# Patient Record
Sex: FEMALE | Race: OTHER | NOT HISPANIC OR LATINO
[De-identification: names, ages, dates, MRNs, and addresses within clinical notes are randomized per-mention and may not be internally consistent; named-entity substitution may affect disease eponyms.]

---

## 2017-07-13 ENCOUNTER — APPOINTMENT (OUTPATIENT)
Dept: VASCULAR SURGERY | Facility: CLINIC | Age: 77
End: 2017-07-13

## 2017-07-20 ENCOUNTER — APPOINTMENT (OUTPATIENT)
Dept: VASCULAR SURGERY | Facility: CLINIC | Age: 77
End: 2017-07-20

## 2017-07-27 ENCOUNTER — APPOINTMENT (OUTPATIENT)
Dept: VASCULAR SURGERY | Facility: CLINIC | Age: 77
End: 2017-07-27
Payer: MEDICARE

## 2017-07-27 PROCEDURE — 10140 I&D HMTMA SEROMA/FLUID COLLJ: CPT

## 2017-07-27 PROCEDURE — 99213 OFFICE O/P EST LOW 20 MIN: CPT | Mod: 25

## 2017-08-03 ENCOUNTER — APPOINTMENT (OUTPATIENT)
Dept: VASCULAR SURGERY | Facility: CLINIC | Age: 77
End: 2017-08-03
Payer: MEDICARE

## 2017-08-03 PROCEDURE — 99024 POSTOP FOLLOW-UP VISIT: CPT

## 2017-08-10 ENCOUNTER — APPOINTMENT (OUTPATIENT)
Dept: VASCULAR SURGERY | Facility: CLINIC | Age: 77
End: 2017-08-10
Payer: MEDICARE

## 2017-08-10 PROCEDURE — 99211 OFF/OP EST MAY X REQ PHY/QHP: CPT

## 2017-08-17 ENCOUNTER — APPOINTMENT (OUTPATIENT)
Dept: VASCULAR SURGERY | Facility: CLINIC | Age: 77
End: 2017-08-17

## 2017-08-24 ENCOUNTER — APPOINTMENT (OUTPATIENT)
Dept: VASCULAR SURGERY | Facility: CLINIC | Age: 77
End: 2017-08-24
Payer: MEDICARE

## 2017-08-24 PROCEDURE — 99211 OFF/OP EST MAY X REQ PHY/QHP: CPT

## 2017-09-07 ENCOUNTER — APPOINTMENT (OUTPATIENT)
Dept: VASCULAR SURGERY | Facility: CLINIC | Age: 77
End: 2017-09-07
Payer: MEDICARE

## 2017-09-07 PROCEDURE — 99213 OFFICE O/P EST LOW 20 MIN: CPT

## 2017-09-14 ENCOUNTER — APPOINTMENT (OUTPATIENT)
Dept: VASCULAR SURGERY | Facility: CLINIC | Age: 77
End: 2017-09-14
Payer: MEDICARE

## 2017-09-14 PROCEDURE — 99211 OFF/OP EST MAY X REQ PHY/QHP: CPT

## 2017-09-21 ENCOUNTER — APPOINTMENT (OUTPATIENT)
Dept: VASCULAR SURGERY | Facility: CLINIC | Age: 77
End: 2017-09-21
Payer: MEDICARE

## 2017-09-21 PROCEDURE — 99211 OFF/OP EST MAY X REQ PHY/QHP: CPT

## 2017-09-28 ENCOUNTER — APPOINTMENT (OUTPATIENT)
Dept: VASCULAR SURGERY | Facility: CLINIC | Age: 77
End: 2017-09-28

## 2017-10-09 ENCOUNTER — APPOINTMENT (OUTPATIENT)
Dept: OPHTHALMOLOGY | Facility: CLINIC | Age: 77
End: 2017-10-09

## 2017-12-28 ENCOUNTER — OUTPATIENT (OUTPATIENT)
Dept: OUTPATIENT SERVICES | Facility: HOSPITAL | Age: 77
LOS: 1 days | End: 2017-12-28
Payer: MEDICARE

## 2017-12-28 PROCEDURE — 71020: CPT | Mod: 26

## 2017-12-28 PROCEDURE — 71046 X-RAY EXAM CHEST 2 VIEWS: CPT

## 2017-12-29 ENCOUNTER — OUTPATIENT (OUTPATIENT)
Dept: OUTPATIENT SERVICES | Facility: HOSPITAL | Age: 77
LOS: 1 days | End: 2017-12-29
Payer: MEDICARE

## 2017-12-29 PROCEDURE — 71250 CT THORAX DX C-: CPT | Mod: 26

## 2017-12-29 PROCEDURE — 71250 CT THORAX DX C-: CPT

## 2018-01-03 ENCOUNTER — EMERGENCY (EMERGENCY)
Facility: HOSPITAL | Age: 78
LOS: 1 days | Discharge: HOME | End: 2018-01-03
Attending: EMERGENCY MEDICINE | Admitting: EMERGENCY MEDICINE
Payer: MEDICARE

## 2018-01-03 VITALS
DIASTOLIC BLOOD PRESSURE: 77 MMHG | WEIGHT: 132.94 LBS | HEIGHT: 67 IN | SYSTOLIC BLOOD PRESSURE: 139 MMHG | RESPIRATION RATE: 16 BRPM | OXYGEN SATURATION: 98 % | TEMPERATURE: 98 F | HEART RATE: 65 BPM
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
## 2018-01-03 DIAGNOSIS — S02.2XXA FRACTURE OF NASAL BONES, INITIAL ENCOUNTER FOR CLOSED FRACTURE: ICD-10-CM

## 2018-01-03 DIAGNOSIS — S09.93XA UNSPECIFIED INJURY OF FACE, INITIAL ENCOUNTER: ICD-10-CM

## 2018-01-03 DIAGNOSIS — Z79.899 OTHER LONG TERM (CURRENT) DRUG THERAPY: ICD-10-CM
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
## 2018-01-03 DIAGNOSIS — Z23 ENCOUNTER FOR IMMUNIZATION: ICD-10-CM

## 2018-01-03 DIAGNOSIS — Z95.2 PRESENCE OF PROSTHETIC HEART VALVE: Chronic | ICD-10-CM

## 2018-01-03 DIAGNOSIS — S09.90XA UNSPECIFIED INJURY OF HEAD, INITIAL ENCOUNTER: ICD-10-CM
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
## 2018-01-03 PROCEDURE — 99284 EMERGENCY DEPT VISIT MOD MDM: CPT

## 2018-01-03 PROCEDURE — 21310: CPT

## 2018-01-03 PROCEDURE — 70486 CT MAXILLOFACIAL W/O DYE: CPT | Mod: 26

## 2018-01-03 PROCEDURE — 90715 TDAP VACCINE 7 YRS/> IM: CPT

## 2018-01-03 PROCEDURE — 72125 CT NECK SPINE W/O DYE: CPT | Mod: 26

## 2018-01-03 PROCEDURE — 99284 EMERGENCY DEPT VISIT MOD MDM: CPT | Mod: 25

## 2018-01-03 PROCEDURE — 72125 CT NECK SPINE W/O DYE: CPT

## 2018-01-03 PROCEDURE — 70486 CT MAXILLOFACIAL W/O DYE: CPT

## 2018-01-03 PROCEDURE — 90471 IMMUNIZATION ADMIN: CPT

## 2018-01-03 PROCEDURE — 70450 CT HEAD/BRAIN W/O DYE: CPT

## 2018-01-03 PROCEDURE — 70450 CT HEAD/BRAIN W/O DYE: CPT | Mod: 26

## 2018-01-03 RX ORDER — TETANUS TOXOID, REDUCED DIPHTHERIA TOXOID AND ACELLULAR PERTUSSIS VACCINE, ADSORBED 5; 2.5; 8; 8; 2.5 [IU]/.5ML; [IU]/.5ML; UG/.5ML; UG/.5ML; UG/.5ML
0.5 SUSPENSION INTRAMUSCULAR ONCE
Qty: 0 | Refills: 0 | Status: COMPLETED | OUTPATIENT
Start: 2018-01-03 | End: 2018-01-03

## 2018-01-03 RX ADMIN — TETANUS TOXOID, REDUCED DIPHTHERIA TOXOID AND ACELLULAR PERTUSSIS VACCINE, ADSORBED 0.5 MILLILITER(S): 5; 2.5; 8; 8; 2.5 SUSPENSION INTRAMUSCULAR at 14:43

## 2018-01-03 NOTE — ED PROVIDER NOTE - CARE PLAN
Principal Discharge DX:	Traumatic injury of head, initial encounter  Secondary Diagnosis:	Closed fracture of nasal bone, initial encounter

## 2018-01-03 NOTE — ED PROVIDER NOTE - MEDICAL DECISION MAKING DETAILS
here w/ head trauma w/ nasal deformity. Unsure if LOC. on coumadin. plan for ct head/max face/c spine. if neg, to dc home. pt wants to minimize time in ED, labs offered, states coumadin level stable, does not want to check or be observed in ED. DC home in NAD with strict return precautions given.

## 2018-01-03 NOTE — ED ADULT TRIAGE NOTE - CHIEF COMPLAINT QUOTE
pt had mechanical fall & hit face on the cement. bruising & swelling to the nose with abrasion. denies LOC, HA, dizziness. pt on coumadin.

## 2018-01-03 NOTE — ED ADULT NURSE NOTE - OBJECTIVE STATEMENT
patient s/p trip and fall outside and hit her nose and face. no LOC. patient states that she takes coumadin because she has a hx of mitral valve replacement. initially nose was bleeding but now stopped. swelling to bridge of nose. denies dizziness, headache, vision change. denies neck or back pain patient last had INR checked a few weeks ago and was therapeutic. unknown last tetanus. ice pack applied, pending MD chino. no acute signs of distress. will continue to monitor.

## 2018-01-03 NOTE — ED PROVIDER NOTE - OBJECTIVE STATEMENT
76yo F hx of rheumatic fever c/p mitral valve replacement w/ mechanical valve on coumadin, hypothyroid, here w/ complaint of trip and fall on sidewalk. Pt hit her face directly on nose. Denies HA or neck pain, but unsure if had LOC, does not quite remember. No N/V. No motor weakness or sensory loss. Ambulatory without issue. Hx of rhinoplasty, very concerned about fx.

## 2018-01-08 ENCOUNTER — APPOINTMENT (OUTPATIENT)
Dept: PLASTIC SURGERY | Facility: CLINIC | Age: 78
End: 2018-01-08
Payer: MEDICARE

## 2018-01-08 VITALS — HEIGHT: 67 IN | WEIGHT: 133 LBS | BODY MASS INDEX: 20.88 KG/M2

## 2018-01-08 PROCEDURE — 99202 OFFICE O/P NEW SF 15 MIN: CPT

## 2018-01-23 ENCOUNTER — TRANSCRIPTION ENCOUNTER (OUTPATIENT)
Age: 78
End: 2018-01-23

## 2019-05-13 ENCOUNTER — APPOINTMENT (OUTPATIENT)
Dept: OPHTHALMOLOGY | Facility: CLINIC | Age: 79
End: 2019-05-13
Payer: MEDICARE

## 2019-05-13 DIAGNOSIS — H00.14 CHALAZION LEFT UPPER EYELID: ICD-10-CM

## 2019-05-13 PROCEDURE — 92012 INTRM OPH EXAM EST PATIENT: CPT

## 2021-04-30 ENCOUNTER — APPOINTMENT (OUTPATIENT)
Dept: OPHTHALMOLOGY | Facility: CLINIC | Age: 81
End: 2021-04-30
Payer: MEDICARE

## 2021-04-30 ENCOUNTER — NON-APPOINTMENT (OUTPATIENT)
Age: 81
End: 2021-04-30

## 2021-04-30 PROCEDURE — 99072 ADDL SUPL MATRL&STAF TM PHE: CPT

## 2021-04-30 PROCEDURE — 92014 COMPRE OPH EXAM EST PT 1/>: CPT

## 2021-06-07 ENCOUNTER — APPOINTMENT (OUTPATIENT)
Dept: OPHTHALMOLOGY | Facility: CLINIC | Age: 81
End: 2021-06-07

## 2021-06-27 ENCOUNTER — TRANSCRIPTION ENCOUNTER (OUTPATIENT)
Age: 81
End: 2021-06-27

## 2021-07-01 ENCOUNTER — TRANSCRIPTION ENCOUNTER (OUTPATIENT)
Age: 81
End: 2021-07-01

## 2021-07-02 ENCOUNTER — TRANSCRIPTION ENCOUNTER (OUTPATIENT)
Age: 81
End: 2021-07-02

## 2021-12-13 ENCOUNTER — TRANSCRIPTION ENCOUNTER (OUTPATIENT)
Age: 81
End: 2021-12-13

## 2021-12-23 ENCOUNTER — TRANSCRIPTION ENCOUNTER (OUTPATIENT)
Age: 81
End: 2021-12-23

## 2021-12-23 ENCOUNTER — APPOINTMENT (OUTPATIENT)
Dept: VASCULAR SURGERY | Facility: CLINIC | Age: 81
End: 2021-12-23
Payer: MEDICARE

## 2021-12-23 ENCOUNTER — INPATIENT (INPATIENT)
Facility: HOSPITAL | Age: 81
LOS: 3 days | Discharge: HOME | DRG: 571 | End: 2021-12-27
Attending: STUDENT IN AN ORGANIZED HEALTH CARE EDUCATION/TRAINING PROGRAM | Admitting: STUDENT IN AN ORGANIZED HEALTH CARE EDUCATION/TRAINING PROGRAM
Payer: MEDICARE

## 2021-12-23 VITALS
HEART RATE: 75 BPM | HEIGHT: 67 IN | SYSTOLIC BLOOD PRESSURE: 125 MMHG | DIASTOLIC BLOOD PRESSURE: 72 MMHG | TEMPERATURE: 98 F | RESPIRATION RATE: 18 BRPM | WEIGHT: 125 LBS

## 2021-12-23 DIAGNOSIS — Z95.2 PRESENCE OF PROSTHETIC HEART VALVE: Chronic | ICD-10-CM

## 2021-12-23 LAB
ALBUMIN SERPL ELPH-MCNC: 4.2 G/DL — SIGNIFICANT CHANGE UP (ref 3.3–5)
ALP SERPL-CCNC: 45 U/L — SIGNIFICANT CHANGE UP (ref 40–120)
ALT FLD-CCNC: 20 U/L — SIGNIFICANT CHANGE UP (ref 10–45)
ANION GAP SERPL CALC-SCNC: 10 MMOL/L — SIGNIFICANT CHANGE UP (ref 5–17)
APTT BLD: 36.8 SEC — HIGH (ref 27.5–35.5)
AST SERPL-CCNC: 43 U/L — HIGH (ref 10–40)
BASOPHILS # BLD AUTO: 0.05 K/UL — SIGNIFICANT CHANGE UP (ref 0–0.2)
BASOPHILS NFR BLD AUTO: 0.6 % — SIGNIFICANT CHANGE UP (ref 0–2)
BILIRUB SERPL-MCNC: 0.4 MG/DL — SIGNIFICANT CHANGE UP (ref 0.2–1.2)
BLD GP AB SCN SERPL QL: NEGATIVE — SIGNIFICANT CHANGE UP
BUN SERPL-MCNC: 15 MG/DL — SIGNIFICANT CHANGE UP (ref 7–23)
CALCIUM SERPL-MCNC: 9.1 MG/DL — SIGNIFICANT CHANGE UP (ref 8.4–10.5)
CHLORIDE SERPL-SCNC: 100 MMOL/L — SIGNIFICANT CHANGE UP (ref 96–108)
CO2 SERPL-SCNC: 27 MMOL/L — SIGNIFICANT CHANGE UP (ref 22–31)
CREAT SERPL-MCNC: 0.82 MG/DL — SIGNIFICANT CHANGE UP (ref 0.5–1.3)
EOSINOPHIL # BLD AUTO: 0.1 K/UL — SIGNIFICANT CHANGE UP (ref 0–0.5)
EOSINOPHIL NFR BLD AUTO: 1.2 % — SIGNIFICANT CHANGE UP (ref 0–6)
GLUCOSE SERPL-MCNC: 99 MG/DL — SIGNIFICANT CHANGE UP (ref 70–99)
HCT VFR BLD CALC: 30.6 % — LOW (ref 34.5–45)
HGB BLD-MCNC: 9.8 G/DL — LOW (ref 11.5–15.5)
IMM GRANULOCYTES NFR BLD AUTO: 0.5 % — SIGNIFICANT CHANGE UP (ref 0–1.5)
INR BLD: 1.76 — HIGH (ref 0.88–1.16)
LYMPHOCYTES # BLD AUTO: 0.89 K/UL — LOW (ref 1–3.3)
LYMPHOCYTES # BLD AUTO: 11 % — LOW (ref 13–44)
MCHC RBC-ENTMCNC: 31.8 PG — SIGNIFICANT CHANGE UP (ref 27–34)
MCHC RBC-ENTMCNC: 32 GM/DL — SIGNIFICANT CHANGE UP (ref 32–36)
MCV RBC AUTO: 99.4 FL — SIGNIFICANT CHANGE UP (ref 80–100)
MONOCYTES # BLD AUTO: 0.73 K/UL — SIGNIFICANT CHANGE UP (ref 0–0.9)
MONOCYTES NFR BLD AUTO: 9 % — SIGNIFICANT CHANGE UP (ref 2–14)
NEUTROPHILS # BLD AUTO: 6.28 K/UL — SIGNIFICANT CHANGE UP (ref 1.8–7.4)
NEUTROPHILS NFR BLD AUTO: 77.7 % — HIGH (ref 43–77)
NRBC # BLD: 0 /100 WBCS — SIGNIFICANT CHANGE UP (ref 0–0)
PLATELET # BLD AUTO: 193 K/UL — SIGNIFICANT CHANGE UP (ref 150–400)
POTASSIUM SERPL-MCNC: 4.6 MMOL/L — SIGNIFICANT CHANGE UP (ref 3.5–5.3)
POTASSIUM SERPL-SCNC: 4.6 MMOL/L — SIGNIFICANT CHANGE UP (ref 3.5–5.3)
PROT SERPL-MCNC: 6.6 G/DL — SIGNIFICANT CHANGE UP (ref 6–8.3)
PROTHROM AB SERPL-ACNC: 20.6 SEC — HIGH (ref 10.6–13.6)
RBC # BLD: 3.08 M/UL — LOW (ref 3.8–5.2)
RBC # FLD: 17.2 % — HIGH (ref 10.3–14.5)
RH IG SCN BLD-IMP: POSITIVE — SIGNIFICANT CHANGE UP
RH IG SCN BLD-IMP: POSITIVE — SIGNIFICANT CHANGE UP
SARS-COV-2 RNA SPEC QL NAA+PROBE: NEGATIVE — SIGNIFICANT CHANGE UP
SODIUM SERPL-SCNC: 137 MMOL/L — SIGNIFICANT CHANGE UP (ref 135–145)
WBC # BLD: 8.09 K/UL — SIGNIFICANT CHANGE UP (ref 3.8–10.5)
WBC # FLD AUTO: 8.09 K/UL — SIGNIFICANT CHANGE UP (ref 3.8–10.5)

## 2021-12-23 PROCEDURE — 99285 EMERGENCY DEPT VISIT HI MDM: CPT | Mod: 25

## 2021-12-23 PROCEDURE — 71045 X-RAY EXAM CHEST 1 VIEW: CPT | Mod: 26

## 2021-12-23 PROCEDURE — 93010 ELECTROCARDIOGRAM REPORT: CPT

## 2021-12-23 PROCEDURE — 99215 OFFICE O/P EST HI 40 MIN: CPT

## 2021-12-23 PROCEDURE — 99222 1ST HOSP IP/OBS MODERATE 55: CPT | Mod: GC

## 2021-12-23 RX ORDER — PIPERACILLIN AND TAZOBACTAM 4; .5 G/20ML; G/20ML
3.38 INJECTION, POWDER, LYOPHILIZED, FOR SOLUTION INTRAVENOUS ONCE
Refills: 0 | Status: COMPLETED | OUTPATIENT
Start: 2021-12-23 | End: 2021-12-23

## 2021-12-23 RX ORDER — LEVOTHYROXINE SODIUM 125 MCG
1 TABLET ORAL
Qty: 0 | Refills: 0 | DISCHARGE

## 2021-12-23 RX ORDER — CARVEDILOL PHOSPHATE 80 MG/1
3.12 CAPSULE, EXTENDED RELEASE ORAL EVERY 12 HOURS
Refills: 0 | Status: DISCONTINUED | OUTPATIENT
Start: 2021-12-23 | End: 2021-12-27

## 2021-12-23 RX ORDER — DIGOXIN 250 MCG
1 TABLET ORAL
Qty: 0 | Refills: 0 | DISCHARGE

## 2021-12-23 RX ORDER — HEPARIN SODIUM 5000 [USP'U]/ML
1100 INJECTION INTRAVENOUS; SUBCUTANEOUS
Qty: 25000 | Refills: 0 | Status: DISCONTINUED | OUTPATIENT
Start: 2021-12-23 | End: 2021-12-25

## 2021-12-23 RX ORDER — PIPERACILLIN AND TAZOBACTAM 4; .5 G/20ML; G/20ML
3.38 INJECTION, POWDER, LYOPHILIZED, FOR SOLUTION INTRAVENOUS EVERY 6 HOURS
Refills: 0 | Status: DISCONTINUED | OUTPATIENT
Start: 2021-12-24 | End: 2021-12-25

## 2021-12-23 RX ORDER — FUROSEMIDE 40 MG
1 TABLET ORAL
Qty: 0 | Refills: 0 | DISCHARGE

## 2021-12-23 RX ORDER — VANCOMYCIN HCL 1 G
VIAL (EA) INTRAVENOUS
Refills: 0 | Status: DISCONTINUED | OUTPATIENT
Start: 2021-12-23 | End: 2021-12-23

## 2021-12-23 RX ORDER — PIPERACILLIN AND TAZOBACTAM 4; .5 G/20ML; G/20ML
3.38 INJECTION, POWDER, LYOPHILIZED, FOR SOLUTION INTRAVENOUS EVERY 6 HOURS
Refills: 0 | Status: DISCONTINUED | OUTPATIENT
Start: 2021-12-23 | End: 2021-12-23

## 2021-12-23 RX ORDER — ROSUVASTATIN CALCIUM 5 MG/1
1 TABLET ORAL
Qty: 0 | Refills: 0 | DISCHARGE

## 2021-12-23 RX ORDER — LEVOTHYROXINE SODIUM 125 MCG
75 TABLET ORAL
Refills: 0 | Status: DISCONTINUED | OUTPATIENT
Start: 2021-12-24 | End: 2021-12-27

## 2021-12-23 RX ORDER — WARFARIN SODIUM 2.5 MG/1
5 TABLET ORAL
Qty: 0 | Refills: 0 | DISCHARGE

## 2021-12-23 RX ORDER — ATORVASTATIN CALCIUM 80 MG/1
20 TABLET, FILM COATED ORAL AT BEDTIME
Refills: 0 | Status: DISCONTINUED | OUTPATIENT
Start: 2021-12-23 | End: 2021-12-27

## 2021-12-23 RX ORDER — SACUBITRIL AND VALSARTAN 24; 26 MG/1; MG/1
1 TABLET, FILM COATED ORAL
Qty: 0 | Refills: 0 | DISCHARGE

## 2021-12-23 RX ORDER — CARVEDILOL PHOSPHATE 80 MG/1
1 CAPSULE, EXTENDED RELEASE ORAL
Qty: 0 | Refills: 0 | DISCHARGE

## 2021-12-23 RX ORDER — DIGOXIN 250 MCG
125 TABLET ORAL EVERY 12 HOURS
Refills: 0 | Status: DISCONTINUED | OUTPATIENT
Start: 2021-12-23 | End: 2021-12-25

## 2021-12-23 RX ORDER — METOPROLOL TARTRATE 50 MG
0 TABLET ORAL
Qty: 0 | Refills: 0 | DISCHARGE

## 2021-12-23 RX ORDER — VANCOMYCIN HCL 1 G
750 VIAL (EA) INTRAVENOUS EVERY 24 HOURS
Refills: 0 | Status: DISCONTINUED | OUTPATIENT
Start: 2021-12-23 | End: 2021-12-25

## 2021-12-23 RX ORDER — AMILORIDE HCL 5 MG
0 TABLET ORAL
Qty: 0 | Refills: 0 | DISCHARGE

## 2021-12-23 RX ORDER — FUROSEMIDE 40 MG
20 TABLET ORAL AT BEDTIME
Refills: 0 | Status: DISCONTINUED | OUTPATIENT
Start: 2021-12-23 | End: 2021-12-24

## 2021-12-23 RX ORDER — LEVOTHYROXINE SODIUM 125 MCG
100 TABLET ORAL
Refills: 0 | Status: DISCONTINUED | OUTPATIENT
Start: 2021-12-25 | End: 2021-12-27

## 2021-12-23 RX ORDER — LEVOTHYROXINE SODIUM 125 MCG
0 TABLET ORAL
Qty: 0 | Refills: 0 | DISCHARGE

## 2021-12-23 RX ORDER — SACUBITRIL AND VALSARTAN 24; 26 MG/1; MG/1
1 TABLET, FILM COATED ORAL
Refills: 0 | Status: DISCONTINUED | OUTPATIENT
Start: 2021-12-23 | End: 2021-12-27

## 2021-12-23 RX ORDER — VANCOMYCIN HCL 1 G
750 VIAL (EA) INTRAVENOUS EVERY 24 HOURS
Refills: 0 | Status: DISCONTINUED | OUTPATIENT
Start: 2021-12-23 | End: 2021-12-23

## 2021-12-23 RX ORDER — DIGOXIN 250 MCG
0 TABLET ORAL
Qty: 0 | Refills: 0 | DISCHARGE

## 2021-12-23 RX ORDER — POTASSIUM CHLORIDE 20 MEQ
0 PACKET (EA) ORAL
Qty: 0 | Refills: 0 | DISCHARGE

## 2021-12-23 RX ORDER — FUROSEMIDE 40 MG
40 TABLET ORAL DAILY
Refills: 0 | Status: DISCONTINUED | OUTPATIENT
Start: 2021-12-23 | End: 2021-12-24

## 2021-12-23 RX ADMIN — Medication 20 MILLIGRAM(S): at 23:02

## 2021-12-23 RX ADMIN — Medication 250 MILLIGRAM(S): at 20:03

## 2021-12-23 RX ADMIN — ATORVASTATIN CALCIUM 20 MILLIGRAM(S): 80 TABLET, FILM COATED ORAL at 23:02

## 2021-12-23 RX ADMIN — HEPARIN SODIUM 11 UNIT(S)/HR: 5000 INJECTION INTRAVENOUS; SUBCUTANEOUS at 21:33

## 2021-12-23 RX ADMIN — CARVEDILOL PHOSPHATE 3.12 MILLIGRAM(S): 80 CAPSULE, EXTENDED RELEASE ORAL at 23:01

## 2021-12-23 RX ADMIN — Medication 125 MICROGRAM(S): at 19:34

## 2021-12-23 RX ADMIN — PIPERACILLIN AND TAZOBACTAM 200 GRAM(S): 4; .5 INJECTION, POWDER, LYOPHILIZED, FOR SOLUTION INTRAVENOUS at 19:33

## 2021-12-23 NOTE — H&P ADULT - NSICDXPASTMEDICALHX_GEN_ALL_CORE_FT
PAST MEDICAL HISTORY:  Congestive heart failure     Hyperlipidemia     Hypertension     Hypothyroidism     Rheumatic fever

## 2021-12-23 NOTE — H&P ADULT - ASSESSMENT
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    Vascular/PAD:  -Added on for debridement in OR tomorrow 12/24/21   -Wound care to L shin daily with xeroform and kerlix  -Compression and elevation to LLE     MVR:   -Hold coumadin   -Heparin gtt    HTN/HLD:  -Continue Carvedilol  -Continue Digoxin  -Continue Rosuvastatin (therapeutic interchange)     CAD/CHF:   -Continue Entresto   -Continue Furosemide    Hypothyroidism:  -Continue Synthroid     ID:  -Vancomycin/Zosyn for infected LE wound     Diet:   -DASH diet  -NPO@MN     Activity:   -OOBA    DVTPPx:   -Hep gtt  -No SCDs    Dispo: Telemetry, 5 Uris

## 2021-12-23 NOTE — ED PROVIDER NOTE - PHYSICAL EXAMINATION
GEN: Well appearing, well developed, awake, alert, oriented to person, place, time/situation and in no apparent distress. NTAF  ENT: Airway patent, Nasal mucosa clear. Mouth with normal mucosa.  EYES: Clear bilaterally. PERRL, EOMI  RESPIRATORY: Breathing comfortably with normal RR. No W/C/R, no hypoxia or resp distress.  CARDIAC: Regular rate and rhythm, no M/R/G  ABDOMEN: Soft, nontender, +bowel sounds, no rebound, rigidity, or guarding.  MSK: +LLE edema with necrotic anterior left shin wound, no active bleeding or DC, no calf TTP, distal pulses difficult to palpate 2/2 edema.  NEURO: Alert and oriented, no focal deficits.  SKIN: Skin normal color for race, warm, dry, necrotic left shin wound, no crepitus, no bleeding or DC.   PSYCH: Alert and oriented to person, place, time/situation. normal mood and affect. no apparent risk to self or others.

## 2021-12-23 NOTE — ED ADULT NURSE NOTE - CHPI ED NUR SYMPTOMS NEG
no bleeding at site/no blood in mucus/no chills/no fever/no purulent drainage/no rectal pain/no redness/no vomiting

## 2021-12-23 NOTE — H&P ADULT - HISTORY OF PRESENT ILLNESS
81yoF with PMH HTN, HLD, CHF, hypothyroidism, rheumatic fever and 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 Patient states she was in [place] at a birthday celebration on 12/4/21 and had 2 glasses of wine, does not remember bumping her leg but started to notice blood dripping down her leg and pooling, then noted a L shin wound. Taken to ED there 2/2 bleeding and coumadin anticoagulation was help. She underwent wound care and antibiotic treatment and restarted her coumadin without issue. Today she saw Dr. Albarado in his office and was found to have necrotic black tissue on the shin wound with surrounding warmth and erythema. Sent in to ED for further management. Denies fevers, chills, CP, SOB, palpitations. Endorses only mild leg pain.     In ED, afebrile, HD stable, labs pending.     PMH: HTN, HLD, CHF, hypothyroidism, rheumatic fever  PSH: MVR (Nov. 2000) on coumadin 6d/wk   Meds: Furosemide 40/20 BID, Tramadol/Acetaminophen 37.5-325 BID, Entresto 24/26 BID, Carvedilol 3.125 BID, Digoxin 125mcg, Coumadin 5mg 6d/wk, Rosuvastatin 5mg QD, Synthroid 75/50 alternating QD   All: NKDA, cats   SHX: nonsmoker, +ETOH 1glass wine/wk, no drugs, worked as a teacher, [facility]   FHX: unknown

## 2021-12-23 NOTE — ED ADULT NURSE NOTE - OBJECTIVE STATEMENT
Pt presents to ED for worsening wound to E s/p trip and fall on 12/4/21, on coumadin. Pt states she has been following up with a wound care doctor but was told to come to ED today for evaluation and admission. Pt c/o mild pain, denies fever/chills, N/V/D, CP, or SOB. LLE anterior side noted with ulcer appx 10x10 cm wrapped with gauze. Pt presents to ED for worsening necrotic wound to [facility] s/p trip and fall on 12/4/21, states banged her leg on a metal gerry, pt on coumadin. Pt states she has been following up with a wound care doctor but was told to come to ED today for evaluation and admission. Pt c/o mild pain with swelling, denies fever/chills, N/V/D, CP, or SOB. LLE anterior side noted with a necrotic ulcer appx 10x10 cm wrapped with gauze. B/L LE edema, left more than right.

## 2021-12-23 NOTE — H&P ADULT - NSHPPHYSICALEXAM_GEN_ALL_CORE
GEN: NAD, resting comfortably in bed   CV: NSR  Pulm: no respiratory distress on RA   Abd: soft, ND, NTTP, no rebound or guarding   Ext: RLE: WWP, no edema, varicose veins, palp fem, palp pop, palp DP, LLE: calf edema and erythema with approximately 8cm necrotic shin wound, no drainage, no induration, no foul odor, covered with xeroform guaze, kerlix and ace wrap, palp fem, palp pop, palp DP, TTP to deep pressure over foot and calf   Ext: motor/sensory grossly intact

## 2021-12-23 NOTE — ED PROVIDER NOTE - OBJECTIVE STATEMENT
81F with a h/o MVR on coumadin, HTN, HLD, CHF, hypothyroidism, L leg wound that she sustained after mechanical trip and fall in [place] on Dec 4th (banged her leg on metal chair) who was sent in by Dr. Beck for worsening necrotic left leg wound. Pt has been getting regular wound care without improvement. She c/o persistent LE edema and pain. Denies bleeding, f/c, cp/sob, n/v or other trauma

## 2021-12-23 NOTE — ED PROVIDER NOTE - CLINICAL SUMMARY MEDICAL DECISION MAKING FREE TEXT BOX
81F with a h/o MVR on coumadin, HTN, HLD, CHF, hypothyroidism, L leg wound that she sustained after mechanical trip and fall in [place] on Dec 4th (banged her leg on metal chair) who was sent in by Dr. Beck for worsening necrotic left leg wound. Pt has been getting regular wound care without improvement. She c/o persistent LE edema and pain. Denies bleeding, f/c, cp/sob, n/v or other trauma.  VSS, exam as noted, necrotic left shin wound and LLE lymphedema.   Preop workup initiated and vascular surgery consulted and will see the patient.

## 2021-12-23 NOTE — ED ADULT TRIAGE NOTE - CHIEF COMPLAINT QUOTE
Patient states on Coumadin for MVR, injured left leg on DEc. 4, went to wound care doctor today Dr. Beck and sent to ED told leg may be infected.

## 2021-12-23 NOTE — ED PROVIDER NOTE - IV ALTEPLASE EXCL ABS HIDDEN
69 year old female with history of chronic thrombocytopenia ,hypertension, depression recent stent placement for ureteral stone admitted with mental status changes found to have profound hyponatremia , thrombocytopenia, anemia, blood loss. Plan as stated. Discussed with resident team. show

## 2021-12-23 NOTE — PATIENT PROFILE ADULT - FALL HARM RISK - HARM RISK INTERVENTIONS

## 2021-12-24 LAB
ANION GAP SERPL CALC-SCNC: 10 MMOL/L — SIGNIFICANT CHANGE UP (ref 5–17)
APTT BLD: 85.6 SEC — HIGH (ref 27.5–35.5)
APTT BLD: 88.2 SEC — HIGH (ref 27.5–35.5)
BUN SERPL-MCNC: 12 MG/DL — SIGNIFICANT CHANGE UP (ref 7–23)
CALCIUM SERPL-MCNC: 9.3 MG/DL — SIGNIFICANT CHANGE UP (ref 8.4–10.5)
CHLORIDE SERPL-SCNC: 103 MMOL/L — SIGNIFICANT CHANGE UP (ref 96–108)
CO2 SERPL-SCNC: 27 MMOL/L — SIGNIFICANT CHANGE UP (ref 22–31)
CREAT SERPL-MCNC: 0.91 MG/DL — SIGNIFICANT CHANGE UP (ref 0.5–1.3)
GLUCOSE SERPL-MCNC: 103 MG/DL — HIGH (ref 70–99)
GRAM STN FLD: SIGNIFICANT CHANGE UP
HCT VFR BLD CALC: 30.1 % — LOW (ref 34.5–45)
HGB BLD-MCNC: 9.7 G/DL — LOW (ref 11.5–15.5)
MAGNESIUM SERPL-MCNC: 2 MG/DL — SIGNIFICANT CHANGE UP (ref 1.6–2.6)
MCHC RBC-ENTMCNC: 31.8 PG — SIGNIFICANT CHANGE UP (ref 27–34)
MCHC RBC-ENTMCNC: 32.2 GM/DL — SIGNIFICANT CHANGE UP (ref 32–36)
MCV RBC AUTO: 98.7 FL — SIGNIFICANT CHANGE UP (ref 80–100)
NRBC # BLD: 0 /100 WBCS — SIGNIFICANT CHANGE UP (ref 0–0)
PHOSPHATE SERPL-MCNC: 2.5 MG/DL — SIGNIFICANT CHANGE UP (ref 2.5–4.5)
PLATELET # BLD AUTO: 184 K/UL — SIGNIFICANT CHANGE UP (ref 150–400)
POTASSIUM SERPL-MCNC: 3.9 MMOL/L — SIGNIFICANT CHANGE UP (ref 3.5–5.3)
POTASSIUM SERPL-SCNC: 3.9 MMOL/L — SIGNIFICANT CHANGE UP (ref 3.5–5.3)
RBC # BLD: 3.05 M/UL — LOW (ref 3.8–5.2)
RBC # FLD: 17 % — HIGH (ref 10.3–14.5)
SODIUM SERPL-SCNC: 140 MMOL/L — SIGNIFICANT CHANGE UP (ref 135–145)
SPECIMEN SOURCE: SIGNIFICANT CHANGE UP
WBC # BLD: 6.03 K/UL — SIGNIFICANT CHANGE UP (ref 3.8–10.5)
WBC # FLD AUTO: 6.03 K/UL — SIGNIFICANT CHANGE UP (ref 3.8–10.5)

## 2021-12-24 PROCEDURE — 73650 X-RAY EXAM OF HEEL: CPT | Mod: 26,RT

## 2021-12-24 PROCEDURE — 99233 SBSQ HOSP IP/OBS HIGH 50: CPT

## 2021-12-24 PROCEDURE — 11042 DBRDMT SUBQ TIS 1ST 20SQCM/<: CPT | Mod: GC

## 2021-12-24 RX ORDER — HYDROMORPHONE HYDROCHLORIDE 2 MG/ML
0.5 INJECTION INTRAMUSCULAR; INTRAVENOUS; SUBCUTANEOUS ONCE
Refills: 0 | Status: DISCONTINUED | OUTPATIENT
Start: 2021-12-24 | End: 2021-12-24

## 2021-12-24 RX ORDER — HYDROMORPHONE HYDROCHLORIDE 2 MG/ML
0.25 INJECTION INTRAMUSCULAR; INTRAVENOUS; SUBCUTANEOUS ONCE
Refills: 0 | Status: DISCONTINUED | OUTPATIENT
Start: 2021-12-24 | End: 2021-12-24

## 2021-12-24 RX ORDER — ACETAMINOPHEN 500 MG
650 TABLET ORAL ONCE
Refills: 0 | Status: COMPLETED | OUTPATIENT
Start: 2021-12-24 | End: 2021-12-24

## 2021-12-24 RX ORDER — METOCLOPRAMIDE HCL 10 MG
10 TABLET ORAL ONCE
Refills: 0 | Status: DISCONTINUED | OUTPATIENT
Start: 2021-12-24 | End: 2021-12-27

## 2021-12-24 RX ORDER — OXYCODONE AND ACETAMINOPHEN 5; 325 MG/1; MG/1
1 TABLET ORAL EVERY 6 HOURS
Refills: 0 | Status: DISCONTINUED | OUTPATIENT
Start: 2021-12-24 | End: 2021-12-25

## 2021-12-24 RX ORDER — ACETAMINOPHEN 500 MG
1000 TABLET ORAL ONCE
Refills: 0 | Status: DISCONTINUED | OUTPATIENT
Start: 2021-12-24 | End: 2021-12-24

## 2021-12-24 RX ORDER — FUROSEMIDE 40 MG
20 TABLET ORAL EVERY 12 HOURS
Refills: 0 | Status: DISCONTINUED | OUTPATIENT
Start: 2021-12-24 | End: 2021-12-26

## 2021-12-24 RX ADMIN — PIPERACILLIN AND TAZOBACTAM 200 GRAM(S): 4; .5 INJECTION, POWDER, LYOPHILIZED, FOR SOLUTION INTRAVENOUS at 12:38

## 2021-12-24 RX ADMIN — PIPERACILLIN AND TAZOBACTAM 200 GRAM(S): 4; .5 INJECTION, POWDER, LYOPHILIZED, FOR SOLUTION INTRAVENOUS at 19:20

## 2021-12-24 RX ADMIN — Medication 125 MICROGRAM(S): at 17:50

## 2021-12-24 RX ADMIN — Medication 125 MICROGRAM(S): at 06:29

## 2021-12-24 RX ADMIN — CARVEDILOL PHOSPHATE 3.12 MILLIGRAM(S): 80 CAPSULE, EXTENDED RELEASE ORAL at 19:20

## 2021-12-24 RX ADMIN — Medication 650 MILLIGRAM(S): at 19:50

## 2021-12-24 RX ADMIN — HYDROMORPHONE HYDROCHLORIDE 0.25 MILLIGRAM(S): 2 INJECTION INTRAMUSCULAR; INTRAVENOUS; SUBCUTANEOUS at 09:28

## 2021-12-24 RX ADMIN — Medication 250 MILLIGRAM(S): at 17:51

## 2021-12-24 RX ADMIN — Medication 40 MILLIGRAM(S): at 06:28

## 2021-12-24 RX ADMIN — OXYCODONE AND ACETAMINOPHEN 1 TABLET(S): 5; 325 TABLET ORAL at 22:05

## 2021-12-24 RX ADMIN — HYDROMORPHONE HYDROCHLORIDE 0.5 MILLIGRAM(S): 2 INJECTION INTRAMUSCULAR; INTRAVENOUS; SUBCUTANEOUS at 09:29

## 2021-12-24 RX ADMIN — PIPERACILLIN AND TAZOBACTAM 200 GRAM(S): 4; .5 INJECTION, POWDER, LYOPHILIZED, FOR SOLUTION INTRAVENOUS at 01:18

## 2021-12-24 RX ADMIN — HYDROMORPHONE HYDROCHLORIDE 0.25 MILLIGRAM(S): 2 INJECTION INTRAMUSCULAR; INTRAVENOUS; SUBCUTANEOUS at 11:14

## 2021-12-24 RX ADMIN — OXYCODONE AND ACETAMINOPHEN 1 TABLET(S): 5; 325 TABLET ORAL at 21:35

## 2021-12-24 RX ADMIN — Medication 75 MICROGRAM(S): at 06:28

## 2021-12-24 RX ADMIN — Medication 20 MILLIGRAM(S): at 17:50

## 2021-12-24 RX ADMIN — SACUBITRIL AND VALSARTAN 1 TABLET(S): 24; 26 TABLET, FILM COATED ORAL at 00:32

## 2021-12-24 RX ADMIN — ATORVASTATIN CALCIUM 20 MILLIGRAM(S): 80 TABLET, FILM COATED ORAL at 21:35

## 2021-12-24 RX ADMIN — HEPARIN SODIUM 11 UNIT(S)/HR: 5000 INJECTION INTRAVENOUS; SUBCUTANEOUS at 12:37

## 2021-12-24 RX ADMIN — PIPERACILLIN AND TAZOBACTAM 200 GRAM(S): 4; .5 INJECTION, POWDER, LYOPHILIZED, FOR SOLUTION INTRAVENOUS at 07:36

## 2021-12-24 RX ADMIN — HYDROMORPHONE HYDROCHLORIDE 0.5 MILLIGRAM(S): 2 INJECTION INTRAMUSCULAR; INTRAVENOUS; SUBCUTANEOUS at 08:56

## 2021-12-24 RX ADMIN — CARVEDILOL PHOSPHATE 3.12 MILLIGRAM(S): 80 CAPSULE, EXTENDED RELEASE ORAL at 07:37

## 2021-12-24 NOTE — PACU DISCHARGE NOTE - COMMENTS
X ray then back to 5 uris off monitor per .. In no distress and pain subsided,.. Dressing to left led c/d/i

## 2021-12-24 NOTE — CONSULT NOTE ADULT - TIME BILLING
-pAF/Flut - Pt. w/ long standing history of paroxysmal Aflut/Afib given h/o MVR - currently intermittent NSR and AF, rates remain well controlled in 60s-70s; c/w Coreg and Digoxin for rate control; c/w Coumadin for A/C  -CHF - h/o mild sCHF - mildly overloaded on exam, NAD; recommended switching PO Lasix to Lasix 20 IV BID for 24 to 48hrs; c/w Coreg 3.125 BID, Entresto 24/26 BID and Digoxin 0.125 daily  -Will continue to follow    Terrence Alva MD  Cardiology Attending

## 2021-12-24 NOTE — PROGRESS NOTE ADULT - ASSESSMENT
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    Vascular/PAD:  -Added on for debridement in OR today 12/24/21   -Wound care to L shin daily with xeroform and kerlix  -Compression and elevation to LLE     MVR:   -Hold coumadin   -Heparin gtt (hold for OR)    HTN/HLD:  -Continue Carvedilol  -Continue Rosuvastatin (therapeutic interchange)     CAD/CHF:  -Continue Digoxin  -Continue Entresto   -Continue Furosemide    Hypothyroidism:  -Continue Synthroid     ID:  -Vancomycin/Zosyn for infected LE wound   - Right foot X-ray for pain     Diet:   -DASH diet  -NPO for procedure    Activity:   -OOBA    DVTPPx:   -Hep gtt  -No SCDs    Dispo: OR

## 2021-12-24 NOTE — PROGRESS NOTE ADULT - SUBJECTIVE AND OBJECTIVE BOX
O/N: ELY, VSS preopped & consented                                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    Vascular/PAD:  -Added on for debridement in OR today 12/24/21   -Wound care to L shin daily with xeroform and kerlix  -Compression and elevation to LLE     MVR:   -Hold coumadin   -Heparin gtt    HTN/HLD:  -Continue Carvedilol  -Continue Digoxin  -Continue Rosuvastatin (therapeutic interchange)     CAD/CHF:   -Continue Entresto   -Continue Furosemide    Hypothyroidism:  -Continue Synthroid     ID:  -Vancomycin/Zosyn for infected LE wound     Diet:   -DASH diet  -NPO for procedure    Activity:   -OOBA    DVTPPx:   -Hep gtt  -No SCDs    Dispo: OR O/N: ELY, VSS preopped & consented    Subjective: Patient visited bedside with chief resident. Is going to OR this morning for debridement of LLE wound. Also complaints of soreness and discomfort on right heel.    ROS:   Denies Headache, blurred vision, Chest Pain, SOB, Abdominal pain, nausea or vomiting     Social   piperacillin/tazobactam IVPB.. 3.375  vancomycin  IVPB 750  carvedilol 3.125  digoxin     Tablet 125  furosemide    Tablet 40  furosemide    Tablet 20  heparin  Infusion 1100  piperacillin/tazobactam IVPB.. 3.375  sacubitril 24 mG/valsartan 26 mG 1  vancomycin  IVPB 750      Allergies    No Known Allergies    Intolerances        Vital Signs Last 24 Hrs  T(C): 37.1 (24 Dec 2021 05:59), Max: 37.1 (24 Dec 2021 05:59)  T(F): 98.7 (24 Dec 2021 05:59), Max: 98.7 (24 Dec 2021 05:59)  HR: 74 (24 Dec 2021 05:05) (70 - 93)  BP: 109/55 (24 Dec 2021 05:05) (109/55 - 127/61)  BP(mean): --  RR: 16 (24 Dec 2021 05:05) (16 - 18)  SpO2: 98% (24 Dec 2021 05:05) (96% - 98%)  I&O's Summary      Physical Exam:  GEN: NAD, resting comfortably in bed   CV: NSR  Pulm: no respiratory distress on RA   Abd: soft, ND, NTTP, no rebound or guarding   Ext: RLE: WWP, no edema, varicose veins, palp fem, palp pop, palp DP, LLE: calf edema and erythema with approximately 8cm necrotic shin wound, no drainage, no induration, no foul odor, covered with xeroform guaze, kerlix and ace wrap, palp fem, palp pop, palp DP, TTP to deep pressure over foot and calf   Ext: motor/sensory grossly intact    LABS:                        9.7    6.03  )-----------( 184      ( 24 Dec 2021 06:46 )             30.1     12-24    140  |  103  |  12  ----------------------------<  103<H>  3.9   |  27  |  0.91    Ca    9.3      24 Dec 2021 06:46  Phos  2.5     12-24  Mg     2.0     12-24    TPro  6.6  /  Alb  4.2  /  TBili  0.4  /  DBili  x   /  AST  43<H>  /  ALT  20  /  AlkPhos  45  12-23    PT/INR - ( 23 Dec 2021 17:27 )   PT: 20.6 sec;   INR: 1.76          PTT - ( 24 Dec 2021 02:25 )  PTT:85.6 sec        Radiology and Additional Studies:    ---------------------------------------------------------------------------  PLEASE CHECK WHEN PRESENT:  [  ] Heart Failure  [  ] Acute  [  ] Acute on Chronic  [x ] Chronic  -------------------------------------------------------------------  [  ]Diastolic [HFpEF]  [  ]Systolic [HFrEF]  [  ]Combined [HFpEF & HFrEF]  [  ] afib  [x ] hypertensive heart disease  [ x ]Other: Rheumatic fever, prosthetic heart valve   -------------------------------------------------------------------  [ ] Respiratory failure  [ ] Acute cor pulmonale  [ ] Asthma/COPD Exacerbation  [ ] Pleural effusion  [ ] Aspiration pneumonia  -------------------------------------------------------------------  [  ]EMERY  [  ]ATN  [  ]Reneal Medullary Necrosis  [  ]Renal Cortical Necrosis  [  ]Other Pathological Lesions:    [  ]CKD 1  [  ]CKD 2  [  ]CKD 3  [  ]CKD 4  [  ]CKD 5  [  ]Other  -------------------------------------------------------------------  [  ]Diabetes  [  ] Diabetic PVD Ulcer  [  ] Neuropathic ulcer to DM  [  ] Diabetes with Nephropathy  [  ] Osteomyelitis due to diabetes  --------------------------------------------------------------------  [  ]Malnutrition: See Nutrition Note  [  ]Cachexia  [  ]Other:   [  ]Supplement Ordered:  [  ]Morbid Obesity (BMI >=40]  ---------------------------------------------------------------------  [ ] Sepsis/severe sepsis/septic shock  [ ] UTI  [ ] Pneumonia  -----------------------------------------------------------------------  [ ] Acidosis/alkalosis  [ ] Fluid overload  [ ] Hypokalemia  [ ] Hyperkalemia  [ ] Hypomagnesemia  [ ] Hypophosphatemia  [ ] Hyperphosphatemia  ------------------------------------------------------------------------  [ ] Acute blood loss anemia  [ ] Post op blood loss anemia  [ ] Iron deficiency anemia  [ ] Anemia due to chronic disease  [ ] Hypercoagulable state  ----------------------------------------------------------------------  [ ] Cerebral infarction  [ ] Transient ischemia attack  [ ] Encephalopathy     O/N: ELY, VSS preopped & consented    Subjective: Patient visited bedside with chief resident. Is going to OR this morning for debridement of LLE wound. Also complaints of soreness and discomfort on right heel.    ROS:   Denies Headache, blurred vision, Chest Pain, SOB, Abdominal pain, nausea or vomiting     Social   piperacillin/tazobactam IVPB.. 3.375  vancomycin  IVPB 750  carvedilol 3.125  digoxin     Tablet 125  furosemide    Tablet 40  furosemide    Tablet 20  heparin  Infusion 1100  piperacillin/tazobactam IVPB.. 3.375  sacubitril 24 mG/valsartan 26 mG 1  vancomycin  IVPB 750      Allergies    No Known Allergies    Intolerances        Vital Signs Last 24 Hrs  T(C): 37.1 (24 Dec 2021 05:59), Max: 37.1 (24 Dec 2021 05:59)  T(F): 98.7 (24 Dec 2021 05:59), Max: 98.7 (24 Dec 2021 05:59)  HR: 74 (24 Dec 2021 05:05) (70 - 93)  BP: 109/55 (24 Dec 2021 05:05) (109/55 - 127/61)  BP(mean): --  RR: 16 (24 Dec 2021 05:05) (16 - 18)  SpO2: 98% (24 Dec 2021 05:05) (96% - 98%)  I&O's Summary      Physical Exam:  GEN: NAD, resting comfortably in bed   CV: NSR  Pulm: no respiratory distress on RA   Abd: soft, ND, NTTP, no rebound or guarding   Ext: RLE: WWP, no edema, varicose veins, palp fem, palp pop, palp DP, LLE: calf edema and erythema with approximately 8cm necrotic shin wound, no drainage, no induration, no foul odor, covered with xeroform guaze, kerlix and ace wrap, palp fem, palp pop, palp DP, TTP to deep pressure over foot and calf   Ext: motor/sensory grossly intact    LABS:                        9.7    6.03  )-----------( 184      ( 24 Dec 2021 06:46 )             30.1     12-24    140  |  103  |  12  ----------------------------<  103<H>  3.9   |  27  |  0.91    Ca    9.3      24 Dec 2021 06:46  Phos  2.5     12-24  Mg     2.0     12-24    TPro  6.6  /  Alb  4.2  /  TBili  0.4  /  DBili  x   /  AST  43<H>  /  ALT  20  /  AlkPhos  45  12-23    PT/INR - ( 23 Dec 2021 17:27 )   PT: 20.6 sec;   INR: 1.76          PTT - ( 24 Dec 2021 02:25 )  PTT:85.6 sec        Radiology and Additional Studies:  CXR (12/23): Hyperinflation. Cardiomegaly, thoracic aortic calcification, mitral valve replacement. Right basilar opacity/pleural effusion.. Stable bony structures.      ---------------------------------------------------------------------------  PLEASE CHECK WHEN PRESENT:  [  ] Heart Failure  [  ] Acute  [  ] Acute on Chronic  [x ] Chronic  -------------------------------------------------------------------  [  ]Diastolic [HFpEF]  [  ]Systolic [HFrEF]  [  ]Combined [HFpEF & HFrEF]  [  ] afib  [x ] hypertensive heart disease  [ x ]Other: Rheumatic fever, prosthetic heart valve   -------------------------------------------------------------------  [ ] Respiratory failure  [ ] Acute cor pulmonale  [ ] Asthma/COPD Exacerbation  [ ] Pleural effusion  [ ] Aspiration pneumonia  -------------------------------------------------------------------  [  ]EMERY  [  ]ATN  [  ]Reneal Medullary Necrosis  [  ]Renal Cortical Necrosis  [  ]Other Pathological Lesions:    [  ]CKD 1  [  ]CKD 2  [  ]CKD 3  [  ]CKD 4  [  ]CKD 5  [  ]Other  -------------------------------------------------------------------  [  ]Diabetes  [  ] Diabetic PVD Ulcer  [  ] Neuropathic ulcer to DM  [  ] Diabetes with Nephropathy  [  ] Osteomyelitis due to diabetes  --------------------------------------------------------------------  [  ]Malnutrition: See Nutrition Note  [  ]Cachexia  [  ]Other:   [  ]Supplement Ordered:  [  ]Morbid Obesity (BMI >=40]  ---------------------------------------------------------------------  [ ] Sepsis/severe sepsis/septic shock  [ ] UTI  [ ] Pneumonia  -----------------------------------------------------------------------  [ ] Acidosis/alkalosis  [ ] Fluid overload  [ ] Hypokalemia  [ ] Hyperkalemia  [ ] Hypomagnesemia  [ ] Hypophosphatemia  [ ] Hyperphosphatemia  ------------------------------------------------------------------------  [ ] Acute blood loss anemia  [ ] Post op blood loss anemia  [ ] Iron deficiency anemia  [ ] Anemia due to chronic disease  [ ] Hypercoagulable state  ----------------------------------------------------------------------  [ ] Cerebral infarction  [ ] Transient ischemia attack  [ ] Encephalopathy

## 2021-12-24 NOTE — PROGRESS NOTE ADULT - SUBJECTIVE AND OBJECTIVE BOX
81yoF with PMH HTN, HLD, CHF, hypothyroidism, rheumatic fever and 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 Patient states she was in [place] at a birthday celebration on 12/4/21 and had 2 glasses of wine, does not remember bumping her leg but started to notice blood dripping down her leg and pooling, then noted a L shin wound. Taken to ED there 2/2 bleeding and coumadin anticoagulation was help. She underwent wound care and antibiotic treatment and restarted her coumadin without issue. Today she saw Dr. Albarado in his office and was found to have necrotic black tissue on the shin wound with surrounding warmth and erythema. Sent in to ED for further management. Denies fevers, chills, CP, SOB, palpitations. Endorses only mild leg pain.     In ED, afebrile, HD stable, labs pending.     PMH: HTN, HLD, CHF, hypothyroidism, rheumatic fever  PSH: MVR (Nov. 2000) on coumadin 6d/wk   Meds: Furosemide 40/20 BID, Tramadol/Acetaminophen 37.5-325 BID, Entresto 24/26 BID, Carvedilol 3.125 BID, Digoxin 125mcg, Coumadin 5mg 6d/wk, Rosuvastatin 5mg QD, Synthroid 75/50 alternating QD   All: NKDA, cats   SHX: nonsmoker, +ETOH 1glass wine/wk, no drugs, worked as a teacher, lives [facility]   FHX: unknown      Review of Systems:  Other Review of Systems: All other review of systems negative, except as noted in HPI      Allergies and Intolerances:        Allergies:  	No Known Allergies:     Home Medications:   * Patient Currently Takes Medications as of 23-Dec-2021 17:21 documented in Structured Notes  · 	furosemide 40 mg oral tablet: Last Dose Taken:  , 1 tab(s) orally once a day  · 	furosemide 20 mg oral tablet: Last Dose Taken:  , 1 tab(s) orally once a day  · 	tramadol-acetaminophen 37.5 mg-325 mg oral tablet: Last Dose Taken:  , 1 tab(s) orally 2 times a day  · 	Entresto 24 mg-26 mg oral tablet: Last Dose Taken:  , 1 tab(s) orally 2 times a day  · 	carvedilol 3.125 mg oral tablet: Last Dose Taken:  , 1 tab(s) orally 2 times a day  · 	digoxin 125 mcg (0.125 mg) oral tablet: Last Dose Taken:  , 1 tab(s) orally 2 times a day  · 	Coumadin 5 mg oral tablet: Last Dose Taken:  , 1 tab(s) orally once a day   	6 days/week  · 	rosuvastatin 5 mg oral tablet: Last Dose Taken:  , 1 tab(s) orally once a day  · 	Synthroid 75 mcg (0.075 mg) oral tablet: Last Dose Taken:  , 1 tab(s) orally once a day alternate with 50mcg once a day. Every other day     .    Patient History:    Past Medical, Past Surgical, and Family History:  PAST MEDICAL HISTORY:  Congestive heart failure     Hyperlipidemia     Hypertension     Hypothyroidism     Rheumatic fever.     PAST SURGICAL HISTORY:  History of mitral valve replacement.     Tobacco Screening:  · Core Measure Site	No    Risk Assessment:    Present on Admission:  Deep Venous Thrombosis	no  Pulmonary Embolus	no     Heart Failure:  Does this patient have a history of or has been diagnosed with heart failure? unknown.    Physical Exam:   Physical Exam: GEN: NAD, resting comfortably in bed   CV: NSR  Pulm: no respiratory distress on RA   Abd: soft, ND, NTTP, no rebound or guarding   Ext: RLE: WWP, no edema, varicose veins, palp fem, palp pop, palp DP, LLE: calf edema and erythema with approximately 8cm necrotic shin wound, no drainage, no induration, no foul odor, covered with xeroform guaze, kerlix and ace wrap, palp fem, palp pop, palp DP, TTP to deep pressure over foot and calf   Ext: motor/sensory grossly intact       Labs and Results:  Labs, Radiology, Cardiology, and Other Results: LABS SENT, PENDING    Assessment and Plan:    Assessment:  · Assessment	  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    Vascular/PAD:  -Added on for debridement in OR tomorrow 12/24/21   -Wound care to L shin daily with xeroform and kerlix  -Compression and elevation to LLE     MVR:   -Hold coumadin   -Heparin gtt    HTN/HLD:  -Continue Carvedilol  -Continue Digoxin  -Continue Rosuvastatin (therapeutic interchange)     CAD/CHF:   -Continue Entresto   -Continue Furosemide    Hypothyroidism:  -Continue Synthroid     ID:  -Vancomycin/Zosyn for infected LE wound     Diet:   -DASH diet  -NPO@MN     Activity:   -JOURDAN    DVTPPx:   -Hep gtt  -No SCDs    Dispo: Telemetry, 5 [facility]      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 Patient states she was in [place] at a birthday celebration on 12/4/21 and had 2 glasses of wine, does not remember bumping her leg but started to notice blood dripping down her leg and pooling, then noted a L shin wound. Taken to ED there 2/2 bleeding and coumadin anticoagulation was help. She underwent wound care and antibiotic treatment and restarted her coumadin without issue. Today she saw Dr. Oakley in his office and was found to have necrotic black tissue on the shin wound with surrounding warmth and erythema. Sent in to ED for further management. Denies fevers, chills, CP, SOB, palpitations. Endorses only mild leg pain.     In ED, afebrile, HD stable, labs pending.     PMH: HTN, HLD, CHF, hypothyroidism, rheumatic fever  PSH: MVR (Nov. 2000) on coumadin 6d/wk   Meds: Furosemide 40/20 BID, Tramadol/Acetaminophen 37.5-325 BID, Entresto 24/26 BID, Carvedilol 3.125 BID, Digoxin 125mcg, Coumadin 5mg 6d/wk, Rosuvastatin 5mg QD, Synthroid 75/50 alternating QD   All: NKDA, cats   SHX: nonsmoker, +ETOH 1glass wine/wk, no drugs, worked as a teacher, lives [facility]   FHX: unknown     Other Review of Systems: All other review of systems negative, except as noted in HPI    Allergies and Intolerances:        Allergies:  	No Known Allergies:     Home Medications:   * Patient Currently Takes Medications as of 23-Dec-2021 17:21 documented in Structured Notes  · 	furosemide 40 mg oral tablet: Last Dose Taken:  , 1 tab(s) orally once a day  · 	furosemide 20 mg oral tablet: Last Dose Taken:  , 1 tab(s) orally once a day  · 	tramadol-acetaminophen 37.5 mg-325 mg oral tablet: Last Dose Taken:  , 1 tab(s) orally 2 times a day  · 	Entresto 24 mg-26 mg oral tablet: Last Dose Taken:  , 1 tab(s) orally 2 times a day  · 	carvedilol 3.125 mg oral tablet: Last Dose Taken:  , 1 tab(s) orally 2 times a day  · 	digoxin 125 mcg (0.125 mg) oral tablet: Last Dose Taken:  , 1 tab(s) orally 2 times a day  · 	Coumadin 5 mg oral tablet: Last Dose Taken:  , 1 tab(s) orally once a day   	6 days/week  · 	rosuvastatin 5 mg oral tablet: Last Dose Taken:  , 1 tab(s) orally once a day  · 	Synthroid 75 mcg (0.075 mg) oral tablet: Last Dose Taken:  , 1 tab(s) orally once a day alternate with 50mcg once a day. Every other day     Patient History:    Past Medical, Past Surgical, and Family History:  PAST MEDICAL HISTORY:  Congestive heart failure     Hyperlipidemia     Hypertension     Hypothyroidism     Rheumatic fever.     PAST SURGICAL HISTORY:  History of mitral valve replacement.     Tobacco Screening:  · Core Measure Site	No    Risk Assessment:    Present on Admission:  Deep Venous Thrombosis	no  Pulmonary Embolus	no     Heart Failure:  Does this patient have a history of or has been diagnosed with heart failure? unknown.    Physical Exam:   - CP - SOB - cough  Physical Exam: GEN: nauseous, distressed about heparin drip, agitated  CV: NSR  Pulm: no respiratory distress on RA   Abd: soft, ND, NTTP, no rebound or guarding   Ext: RLE: WWP, no edema, varicose veins, palp fem, palp pop, palp DP, LLE: calf edema and erythema with approximately 8cm necrotic shin wound, no drainage, no induration, no foul odor, covered with xeroform guaze, kerlix and ace wrap, palp fem, palp pop, palp DP, TTP to deep pressure over foot and calf   left lower leg with dressing  Ext: motor/sensory grossly intact       Labs and Results:  Labs, Radiology, Cardiology, and Other Results: reviewed    Hb 10.4    PTT 84    CXR: cardiomegaly, right pleural effusion, MVR      · Assessment	  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    Vascular/PAD:  - Debridement in OR completed  - Wound care to L shin daily with xeroform and kerlix  - Compression and elevation to LLE     MVR:   -Hold coumadin   -Heparin gtt  -She does not want heparin drip and difficult to obtain PTT  -enoxaparin and warfarin are requested by patient    HTN/HLD:  -Continue Carvedilol  -Continue Digoxin  -Continue Rosuvastatin (therapeutic interchange)     CAD/CHF:   -Continue Entresto   -Continue Furosemide    Hypothyroidism:  -Continue Synthroid     ID:  -Vancomycin and pip/tazo for infected LE wound     Diet:   -DASH diet  -NPO@MN     Activity:   -OOBA    DVTPPx:   -Hep gtt although she does not want it  -No SCDs    Dispo: Telemetry, 5 [facility]      [facility] PUD IM ATTENDING FOR DR BRADSHAW    81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 Patient states she was in [place] at a birthday celebration on 12/4/21 and had 2 glasses of wine, does not remember bumping her leg but started to notice blood dripping down her leg and pooling, then noted a L shin wound. Taken to ED there 2/2 bleeding and coumadin anticoagulation was help. She underwent wound care and antibiotic treatment and restarted her coumadin without issue. Today she saw Dr. Oakley in his office and was found to have necrotic black tissue on the shin wound with surrounding warmth and erythema. Sent in to ED for further management. Denies fevers, chills, CP, SOB, palpitations. Endorses only mild leg pain.     In ED, afebrile, HD stable, labs pending.     PMH: HTN, HLD, CHF, hypothyroidism, rheumatic fever  PSH: MVR (Nov. 2000) on coumadin 6d/wk   Meds: Furosemide 40/20 BID, Tramadol/Acetaminophen 37.5-325 BID, Entresto 24/26 BID, Carvedilol 3.125 BID, Digoxin 125mcg, Coumadin 5mg 6d/wk, Rosuvastatin 5mg QD, Synthroid 75/50 alternating QD   All: NKDA, cats   SHX: nonsmoker, +ETOH 1glass wine/wk, no drugs, worked as a teacher, lives [facility]   FHX: unknown     Other Review of Systems: All other review of systems negative, except as noted in HPI    Allergies and Intolerances:        Allergies:  	No Known Allergies:     Home Medications:   * Patient Currently Takes Medications as of 23-Dec-2021 17:21 documented in Structured Notes  · 	furosemide 40 mg oral tablet: Last Dose Taken:  , 1 tab(s) orally once a day  · 	furosemide 20 mg oral tablet: Last Dose Taken:  , 1 tab(s) orally once a day  · 	tramadol-acetaminophen 37.5 mg-325 mg oral tablet: Last Dose Taken:  , 1 tab(s) orally 2 times a day  · 	Entresto 24 mg-26 mg oral tablet: Last Dose Taken:  , 1 tab(s) orally 2 times a day  · 	carvedilol 3.125 mg oral tablet: Last Dose Taken:  , 1 tab(s) orally 2 times a day  · 	digoxin 125 mcg (0.125 mg) oral tablet: Last Dose Taken:  , 1 tab(s) orally 2 times a day  · 	Coumadin 5 mg oral tablet: Last Dose Taken:  , 1 tab(s) orally once a day   	6 days/week  · 	rosuvastatin 5 mg oral tablet: Last Dose Taken:  , 1 tab(s) orally once a day  · 	Synthroid 75 mcg (0.075 mg) oral tablet: Last Dose Taken:  , 1 tab(s) orally once a day alternate with 50mcg once a day. Every other day     Patient History:    Past Medical, Past Surgical, and Family History:  PAST MEDICAL HISTORY:  Congestive heart failure     Hyperlipidemia     Hypertension     Hypothyroidism     Rheumatic fever.     PAST SURGICAL HISTORY:  History of mitral valve replacement.     Tobacco Screening:  · Core Measure Site	No    Risk Assessment:    Present on Admission:  Deep Venous Thrombosis	no  Pulmonary Embolus	no     Heart Failure:  Does this patient have a history of or has been diagnosed with heart failure? unknown.    Physical Exam:   - CP - SOB - cough  Physical Exam: GEN: nauseous, distressed about heparin drip, agitated  CV: NSR  Pulm: no respiratory distress on RA   Abd: soft, ND, NTTP, no rebound or guarding   Ext: RLE: WWP, no edema, varicose veins, palp fem, palp pop, palp DP, LLE: calf edema and erythema with approximately 8cm necrotic shin wound, no drainage, no induration, no foul odor, covered with xeroform guaze, kerlix and ace wrap, palp fem, palp pop, palp DP, TTP to deep pressure over foot and calf   left lower leg with dressing  Ext: motor/sensory grossly intact       Labs and Results:  Labs, Radiology, Cardiology, and Other Results: reviewed    Hb 10.4    PTT 84    CXR: cardiomegaly, right pleural effusion, MVR      · Assessment	  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    Vascular/PAD:  - Debridement in OR completed  - Wound care to L shin daily with xeroform and kerlix  - Compression and elevation to LLE     MVR:   -Hold coumadin   -Heparin gtt  -She does not want heparin drip and difficult to obtain PTT  -enoxaparin and warfarin are requested by patient    HTN/HLD:  -Continue Carvedilol  -Continue Digoxin  -Continue Rosuvastatin (therapeutic interchange)     CAD/CHF:   -Continue Entresto   -Continue Furosemide    Hypothyroidism:  -Continue Synthroid     ID:  -Vancomycin and pip/tazo for infected LE wound     Diet:   -DASH diet  -NPO@MN     Activity:   -OOBA    DVTPPx:   -Hep gtt although she does not want it  -No SCDs    Dispo: Telemetry, 5 [facility]     ADD:    - Patient is now vomiting but does not want medication such as iv metoclopramide. She would like to try dry crackers.    - Pain is controlled for now. Continue tylenol up to 1 gram q6 hour, try to avoid opiates.    - She does not want heparin drip. Discussed with PGY and chief resident. Currently not safe to change to enoxaparin and warfarin.  Perhaps tonight. Heparin drip for now "to be safe". Discussed with patient who agreed.

## 2021-12-24 NOTE — BRIEF OPERATIVE NOTE - OPERATION/FINDINGS
Procedure: LLE wound debridement  Indication: LLE wound with necrotic skin  Description: Superficial layer of scab and necrotic tissue removed with 15 blade and debrided back to healthy tissue. Dressed with wet to dry kerlix and ace

## 2021-12-24 NOTE — CONSULT NOTE ADULT - SUBJECTIVE AND OBJECTIVE BOX
HPI:  81F w/ HTN, HLD, sCHF, hypothyroidism, rheumatic fever s/p MVR (2000 on coumadin), pAFib/flutter who p/w worsening L shin wound. Patient bumped leg a few weeks ago, which was complicated by bleeding and infection. She underwent debridement with Vascular surgery today. Upon arrival back to , there was a change in rhythm into atrial flutter with 4:1 AV block. Currently denies any chest pain or shortness of breath. Endorses LE pain. States she has a h/o of fib/flutter.    PMH: HTN, HLD, CHF, hypothyroidism, rheumatic fever  PSH: MVR (Nov. 2000) on coumadin 6d/wk   Meds: Furosemide 40/20 BID, Tramadol/Acetaminophen 37.5-325 BID, Entresto 24/26 BID, Carvedilol 3.125 BID, Digoxin 125mcg, Coumadin 5mg 6d/wk, Rosuvastatin 5mg QD, Synthroid 75/50 alternating QD   All: NKDA, cats   SHX: nonsmoker, +ETOH 1glass wine/wk, no drugs, worked as a teacher, lives [facility]   FHX: unknown  (23 Dec 2021 17:05)    ROS: A 10-point review of systems was otherwise negative.    PAST MEDICAL & SURGICAL HISTORY:  Rheumatic fever    Hypertension    Hyperlipidemia    Hypothyroidism    Congestive heart failure    History of mitral valve replacement    ALLERGIES: 	  No Known Allergies        MEDICATIONS:  atorvastatin 20 milliGRAM(s) Oral at bedtime  carvedilol 3.125 milliGRAM(s) Oral every 12 hours  digoxin     Tablet 125 MICROGram(s) Oral every 12 hours  furosemide   Injectable 20 milliGRAM(s) IV Push every 12 hours  heparin  Infusion 1100 Unit(s)/Hr IV Continuous <Continuous>  levothyroxine 75 MICROGram(s) Oral <User Schedule>  metoclopramide Injectable 10 milliGRAM(s) IV Push once PRN  oxycodone    5 mG/acetaminophen 325 mG 1 Tablet(s) Oral every 6 hours PRN  piperacillin/tazobactam IVPB.. 3.375 Gram(s) IV Intermittent every 6 hours  sacubitril 24 mG/valsartan 26 mG 1 Tablet(s) Oral two times a day  vancomycin  IVPB 750 milliGRAM(s) IV Intermittent every 24 hours      PHYSICAL EXAM:  T(C): 36.5 (12-24-21 @ 18:07), Max: 37.2 (12-24-21 @ 08:56)  HR: 78 (12-24-21 @ 19:30) (57 - 97)  BP: 99/66 (12-24-21 @ 19:30) (99/66 - 136/62)  RR: 19 (12-24-21 @ 19:30) (12 - 22)  SpO2: 96% (12-24-21 @ 11:00) (94% - 100%)  Wt(kg): --    GEN: Awake, comfortable. NAD.   HEENT: NCAT, PERRL, Mucosa moist. No JVD.   RESP: CTA b/l, crackles at bases  CV: RRR, normal s1/s2. No m/r/g.  ABD: Soft, NTND. BS+  EXT: Warm. LLE surgical wound w/ dressing. trace LE edema.  NEURO: AAOx3. No focal deficits.    I&O's Summary    24 Dec 2021 07:01  -  24 Dec 2021 20:56  --------------------------------------------------------  IN: 1055 mL / OUT: 500 mL / NET: 555 mL        	  LABS:	 	    CARDIAC MARKERS:                          9.7    6.03  )-----------( 184      ( 24 Dec 2021 06:46 )             30.1     12-24    140  |  103  |  12  ----------------------------<  103<H>  3.9   |  27  |  0.91    Ca    9.3      24 Dec 2021 06:46  Phos  2.5     12-24  Mg     2.0     12-24    TPro  6.6  /  Alb  4.2  /  TBili  0.4  /  DBili  x   /  AST  43<H>  /  ALT  20  /  AlkPhos  45  12-23    TELEMETRY: aflutter w/ 4:1 block

## 2021-12-24 NOTE — CONSULT NOTE ADULT - ASSESSMENT
81F w/ HTN, HLD, sCHF, hypothyroidism, rheumatic fever s/p MVR (2000 on coumadin), pAFib/flutter who p/w worsening L shin wound.      81F w/ HTN, HLD, sCHF, hypothyroidism, rheumatic fever s/p MVR (2000 on coumadin), pAFib/flutter who p/w worsening L shin wound. Cardiology consulted for atrial flutter after LLE wound debridement.    Atrial flutter: w. 4:1 block  - Pt with known atrial flutter  - Currently asymptomatic and denies chest pain  - Potentially triggered by leg pain and surgery  - Currently rate controlled  - Slightly volume up on exam (another possible cause for change in rhythm)  - Please switch lasix 20 mg po qd to 20 mg IV lasix BID  - Continue rest of home meds: Coreg 3.125 mg BID, Entresto, Digoxin 0.125 qd  - Continue home coumadin once safe from a surgical/wound perspective      81F w/ HTN, HLD, sCHF, hypothyroidism, rheumatic fever s/p MVR (2000 on coumadin), pAFib/flutter who p/w worsening L shin wound. Cardiology consulted for atrial flutter after LLE wound debridement.    Atrial flutter: w. 4:1 block  - Pt with known atrial flutter  - Currently asymptomatic and denies chest pain  - Potentially triggered by leg pain and surgery  - Currently rate controlled  - Slightly volume up on exam (another possible cause for change in rhythm)  - Please switch lasix 20 mg po qd to 20 mg IV lasix BID  - Continue rest of home meds: Coreg 3.125 mg BID, Entresto 24/26 BID, Digoxin 0.125 qd  - Continue home coumadin once safe from a surgical/wound perspective

## 2021-12-24 NOTE — PROGRESS NOTE ADULT - SUBJECTIVE AND OBJECTIVE BOX
Called pt.  She wants these 2 meds.    In her appt, she meant she didn't want any new meds.  Prescription approved per provider response.  DOMINICK Pate     Vascular Surgery Post-Op Note    Procedure: LLE debridement    Diagnosis/Indication: LLE ulcer    Surgeon: Dr. Snyder    S: Pt visited bedside after the LLE wound debridement. Complains of mouth dryness and nausea. Pain well controlled after receiving IV Dilaudid in PACU. Denies dizziness, light-headedness, palpitation, coughs, CP, SOB, SHARMA, or calf tenderness.    O:  T(C): 36.4 (12-24-21 @ 13:43), Max: 36.4 (12-24-21 @ 13:43)  T(F): 97.5 (12-24-21 @ 13:43), Max: 97.5 (12-24-21 @ 13:43)  HR: 97 (12-24-21 @ 13:32) (58 - 97)  BP: 122/65 (12-24-21 @ 13:32) (102/50 - 122/65)  RR: 19 (12-24-21 @ 13:32) (19 - 19)  SpO2: 96% (12-24-21 @ 11:00) (96% - 96%)  Wt(kg): --                        9.7    6.03  )-----------( 184      ( 24 Dec 2021 06:46 )             30.1     12-24    140  |  103  |  12  ----------------------------<  103<H>  3.9   |  27  |  0.91    Ca    9.3      24 Dec 2021 06:46  Phos  2.5     12-24  Mg     2.0     12-24    TPro  6.6  /  Alb  4.2  /  TBili  0.4  /  DBili  x   /  AST  43<H>  /  ALT  20  /  AlkPhos  45  12-23    Physical Exam:  GEN: NAD, resting comfortably in bed   CV: NSR  Pulm: Mild bibasilar crackles.   Abd: soft, ND, NTTP, no rebound or guarding   Ext: RLE: WWP, no edema, varicose veins, palp fem, palp pop, palp DP, LLE wound debridement site wrapped by dressing and ACE bandage looking dry and w/o any apparent bleeding.  Ext: motor/sensory grossly intact

## 2021-12-24 NOTE — PROGRESS NOTE ADULT - ASSESSMENT
82yo female s/p above procedure. Was complaining of some nausea post-op. No other symptoms. VSS. 4:1 atrial flutter with one episode of VTach (3PVCs) on monitoring, which is not new to the pt. Has been on Digoxin and AC. Dr. Alva (cardiology) consulted which recommends switching Lasix PO to IV for a few days since a little volume overloaded. Doesn't need rate control.     Diet: DASH/TLC  IVABx (Zosyn 3.375g q6h + Vancomycin 750mg q24h)  Pain/nausea control  C/W home meds (atorvastatin 20mg qhs, coreg 3.125mg q12h, digoxin 125ug q12h, Entresto 1 tab BID)  IV Lasix 20mg BID  DVT ppx: heparin gtt  FU OR Cx  FU cardiology recs  Dispo plan: TBD

## 2021-12-25 LAB
ANION GAP SERPL CALC-SCNC: 9 MMOL/L — SIGNIFICANT CHANGE UP (ref 5–17)
APTT BLD: 87.4 SEC — HIGH (ref 27.5–35.5)
BUN SERPL-MCNC: 15 MG/DL — SIGNIFICANT CHANGE UP (ref 7–23)
CALCIUM SERPL-MCNC: 8.5 MG/DL — SIGNIFICANT CHANGE UP (ref 8.4–10.5)
CHLORIDE SERPL-SCNC: 97 MMOL/L — SIGNIFICANT CHANGE UP (ref 96–108)
CO2 SERPL-SCNC: 29 MMOL/L — SIGNIFICANT CHANGE UP (ref 22–31)
CREAT SERPL-MCNC: 0.99 MG/DL — SIGNIFICANT CHANGE UP (ref 0.5–1.3)
GLUCOSE SERPL-MCNC: 124 MG/DL — HIGH (ref 70–99)
HCT VFR BLD CALC: 29.5 % — LOW (ref 34.5–45)
HGB BLD-MCNC: 9.4 G/DL — LOW (ref 11.5–15.5)
INR BLD: 1.53 — HIGH (ref 0.88–1.16)
MAGNESIUM SERPL-MCNC: 1.9 MG/DL — SIGNIFICANT CHANGE UP (ref 1.6–2.6)
MCHC RBC-ENTMCNC: 30.8 PG — SIGNIFICANT CHANGE UP (ref 27–34)
MCHC RBC-ENTMCNC: 31.9 GM/DL — LOW (ref 32–36)
MCV RBC AUTO: 96.7 FL — SIGNIFICANT CHANGE UP (ref 80–100)
NRBC # BLD: 0 /100 WBCS — SIGNIFICANT CHANGE UP (ref 0–0)
PHOSPHATE SERPL-MCNC: 3.3 MG/DL — SIGNIFICANT CHANGE UP (ref 2.5–4.5)
PLATELET # BLD AUTO: 193 K/UL — SIGNIFICANT CHANGE UP (ref 150–400)
POTASSIUM SERPL-MCNC: 3.6 MMOL/L — SIGNIFICANT CHANGE UP (ref 3.5–5.3)
POTASSIUM SERPL-SCNC: 3.6 MMOL/L — SIGNIFICANT CHANGE UP (ref 3.5–5.3)
PROTHROM AB SERPL-ACNC: 18 SEC — HIGH (ref 10.6–13.6)
RBC # BLD: 3.05 M/UL — LOW (ref 3.8–5.2)
RBC # FLD: 16.7 % — HIGH (ref 10.3–14.5)
SODIUM SERPL-SCNC: 135 MMOL/L — SIGNIFICANT CHANGE UP (ref 135–145)
TSH SERPL-MCNC: 1.3 UIU/ML — SIGNIFICANT CHANGE UP (ref 0.27–4.2)
WBC # BLD: 6.93 K/UL — SIGNIFICANT CHANGE UP (ref 3.8–10.5)
WBC # FLD AUTO: 6.93 K/UL — SIGNIFICANT CHANGE UP (ref 3.8–10.5)

## 2021-12-25 PROCEDURE — 99233 SBSQ HOSP IP/OBS HIGH 50: CPT

## 2021-12-25 PROCEDURE — 93306 TTE W/DOPPLER COMPLETE: CPT | Mod: 26

## 2021-12-25 PROCEDURE — 99232 SBSQ HOSP IP/OBS MODERATE 35: CPT | Mod: GC

## 2021-12-25 RX ORDER — CEFAZOLIN SODIUM 1 G
1000 VIAL (EA) INJECTION EVERY 8 HOURS
Refills: 0 | Status: DISCONTINUED | OUTPATIENT
Start: 2021-12-25 | End: 2021-12-27

## 2021-12-25 RX ORDER — CEFAZOLIN SODIUM 1 G
VIAL (EA) INJECTION
Refills: 0 | Status: DISCONTINUED | OUTPATIENT
Start: 2021-12-25 | End: 2021-12-27

## 2021-12-25 RX ORDER — ENOXAPARIN SODIUM 100 MG/ML
50 INJECTION SUBCUTANEOUS EVERY 12 HOURS
Refills: 0 | Status: DISCONTINUED | OUTPATIENT
Start: 2021-12-25 | End: 2021-12-25

## 2021-12-25 RX ORDER — POTASSIUM CHLORIDE 20 MEQ
40 PACKET (EA) ORAL ONCE
Refills: 0 | Status: COMPLETED | OUTPATIENT
Start: 2021-12-25 | End: 2021-12-25

## 2021-12-25 RX ORDER — WARFARIN SODIUM 2.5 MG/1
5 TABLET ORAL ONCE
Refills: 0 | Status: COMPLETED | OUTPATIENT
Start: 2021-12-25 | End: 2021-12-25

## 2021-12-25 RX ORDER — CEFAZOLIN SODIUM 1 G
1000 VIAL (EA) INJECTION ONCE
Refills: 0 | Status: COMPLETED | OUTPATIENT
Start: 2021-12-25 | End: 2021-12-25

## 2021-12-25 RX ORDER — ACETAMINOPHEN 500 MG
650 TABLET ORAL EVERY 6 HOURS
Refills: 0 | Status: DISCONTINUED | OUTPATIENT
Start: 2021-12-25 | End: 2021-12-27

## 2021-12-25 RX ORDER — ENOXAPARIN SODIUM 100 MG/ML
60 INJECTION SUBCUTANEOUS EVERY 12 HOURS
Refills: 0 | Status: DISCONTINUED | OUTPATIENT
Start: 2021-12-25 | End: 2021-12-27

## 2021-12-25 RX ADMIN — Medication 100 MICROGRAM(S): at 06:37

## 2021-12-25 RX ADMIN — Medication 40 MILLIEQUIVALENT(S): at 09:16

## 2021-12-25 RX ADMIN — OXYCODONE AND ACETAMINOPHEN 1 TABLET(S): 5; 325 TABLET ORAL at 05:03

## 2021-12-25 RX ADMIN — Medication 100 MILLIGRAM(S): at 21:36

## 2021-12-25 RX ADMIN — Medication 125 MICROGRAM(S): at 06:37

## 2021-12-25 RX ADMIN — SACUBITRIL AND VALSARTAN 1 TABLET(S): 24; 26 TABLET, FILM COATED ORAL at 21:36

## 2021-12-25 RX ADMIN — Medication 20 MILLIGRAM(S): at 17:57

## 2021-12-25 RX ADMIN — CARVEDILOL PHOSPHATE 3.12 MILLIGRAM(S): 80 CAPSULE, EXTENDED RELEASE ORAL at 21:36

## 2021-12-25 RX ADMIN — SACUBITRIL AND VALSARTAN 1 TABLET(S): 24; 26 TABLET, FILM COATED ORAL at 09:03

## 2021-12-25 RX ADMIN — PIPERACILLIN AND TAZOBACTAM 200 GRAM(S): 4; .5 INJECTION, POWDER, LYOPHILIZED, FOR SOLUTION INTRAVENOUS at 01:07

## 2021-12-25 RX ADMIN — Medication 100 MILLIGRAM(S): at 09:47

## 2021-12-25 RX ADMIN — ATORVASTATIN CALCIUM 20 MILLIGRAM(S): 80 TABLET, FILM COATED ORAL at 21:37

## 2021-12-25 RX ADMIN — WARFARIN SODIUM 5 MILLIGRAM(S): 2.5 TABLET ORAL at 21:37

## 2021-12-25 RX ADMIN — Medication 650 MILLIGRAM(S): at 18:56

## 2021-12-25 RX ADMIN — CARVEDILOL PHOSPHATE 3.12 MILLIGRAM(S): 80 CAPSULE, EXTENDED RELEASE ORAL at 09:03

## 2021-12-25 RX ADMIN — Medication 20 MILLIGRAM(S): at 06:51

## 2021-12-25 RX ADMIN — ENOXAPARIN SODIUM 60 MILLIGRAM(S): 100 INJECTION SUBCUTANEOUS at 10:29

## 2021-12-25 RX ADMIN — PIPERACILLIN AND TAZOBACTAM 200 GRAM(S): 4; .5 INJECTION, POWDER, LYOPHILIZED, FOR SOLUTION INTRAVENOUS at 06:38

## 2021-12-25 RX ADMIN — Medication 100 MILLIGRAM(S): at 16:21

## 2021-12-25 RX ADMIN — Medication 650 MILLIGRAM(S): at 17:56

## 2021-12-25 RX ADMIN — OXYCODONE AND ACETAMINOPHEN 1 TABLET(S): 5; 325 TABLET ORAL at 05:33

## 2021-12-25 NOTE — PHYSICAL THERAPY INITIAL EVALUATION ADULT - ADDITIONAL COMMENTS
Patient lives alone in elevator accessible apartment. Does not use any Assistive Devices at baseline. Owns SC. Denies recent Hx of falls.

## 2021-12-25 NOTE — PROGRESS NOTE ADULT - SUBJECTIVE AND OBJECTIVE BOX
ON: Nausea improved, Percocet for leg pain, soft BP (98/48), Entresto held, HR 48 6am (am carvedilol held)   12/24: BCx NGx1d, s/p LLE wound debridement, CXR R (12/23) basilar opacity, right foot xray done, restarted on hep gtt (11cc/hr), POC mild nausea w atrial flutter (4:1) and VTach x1, cards consulted (rec switching Lasix PO to IV 20mg BID, c/w digoxin, AC and home meds), aPTT 88S (6pm), heparin dose not changed, Tylenol 650 x1 (for post-op pain)nausea improved.                                 ---------------------------------------------------------------------------  PLEASE CHECK WHEN PRESENT:  [x ] Heart Failure  [  ] Acute  [  ] Acute on Chronic  [x ] Chronic  -------------------------------------------------------------------  [  ]Diastolic [HFpEF]  [  ]Systolic [HFrEF]  [  ]Combined [HFpEF & HFrEF]  [  ] afib  [x ] hypertensive heart disease  [ x ]Other: Rheumatic fever, prosthetic heart valve   -------------------------------------------------------------------  [ ] Respiratory failure  [ ] Acute cor pulmonale  [ ] Asthma/COPD Exacerbation  [ ] Pleural effusion  [ ] Aspiration pneumonia  -------------------------------------------------------------------  [  ]EMERY  [  ]ATN  [  ]Reneal Medullary Necrosis  [  ]Renal Cortical Necrosis  [  ]Other Pathological Lesions:    [  ]CKD 1  [  ]CKD 2  [  ]CKD 3  [  ]CKD 4  [  ]CKD 5  [  ]Other  -------------------------------------------------------------------  [  ]Diabetes  [  ] Diabetic PVD Ulcer  [  ] Neuropathic ulcer to DM  [  ] Diabetes with Nephropathy  [  ] Osteomyelitis due to diabetes  --------------------------------------------------------------------  [  ]Malnutrition: See Nutrition Note  [  ]Cachexia  [  ]Other:   [  ]Supplement Ordered:  [  ]Morbid Obesity (BMI >=40]  ---------------------------------------------------------------------  [ ] Sepsis/severe sepsis/septic shock  [ ] UTI  [ ] Pneumonia  -----------------------------------------------------------------------  [ ] Acidosis/alkalosis  [ ] Fluid overload  [ ] Hypokalemia  [ ] Hyperkalemia  [ ] Hypomagnesemia  [ ] Hypophosphatemia  [ ] Hyperphosphatemia  ------------------------------------------------------------------------  [ ] Acute blood loss anemia  [ ] Post op blood loss anemia  [ ] Iron deficiency anemia  [ ] Anemia due to chronic disease  [ ] Hypercoagulable state  ----------------------------------------------------------------------  [ ] Cerebral infarction  [ ] Transient ischemia attack  [ ] Encephalopathy        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    Vascular/PAD:  -s/p LLE wound debridement (12/24/21 )  -Wound care to L shin daily with xeroform and kerlix  -Compression and elevation to LLE  - Pain control prn    MVR:   -Hold coumadin   -Heparin gtt (check aPTT 6am)    HTN/HLD:  -Continue Carvedilol  -Continue Rosuvastatin (therapeutic interchange)     CAD/CHF:  -Continue Digoxin  -Continue Entresto   -Continue Furosemide    Hypothyroidism:  -Continue Synthroid     ID:  -Vancomycin/Zosyn for infected LE wound   - Right foot X-ray for pain     Diet:   -DASH diet  -NPO for procedure    Activity:   -OOBA    DVTPPx:   -Hep gtt  -No SCDs    Dispo: TBD   ON: Nausea improved, Percocet for leg pain, soft BP (98/48), Entresto held, HR 48 6am (am carvedilol held)   12/24: BCx NGx1d, s/p LLE wound debridement, CXR R (12/23) basilar opacity, right foot xray done, restarted on hep gtt (11cc/hr), POC mild nausea w atrial flutter (4:1) and VTach x1, cards consulted (rec switching Lasix PO to IV 20mg BID, c/w digoxin, AC and home meds), aPTT 88S (6pm), heparin dose not changed, Tylenol 650 x1 (for post-op pain)nausea improved.        SUBJECTIVE: Patient seen and examined bedside by vascular team. No complaints at this time. Comfortable. Denies any fevers, chills, worsening pain at shin hematoma site.    carvedilol 3.125 milliGRAM(s) Oral every 12 hours  digoxin     Tablet 125 MICROGram(s) Oral every 12 hours  enoxaparin Injectable 60 milliGRAM(s) SubCutaneous every 12 hours  furosemide   Injectable 20 milliGRAM(s) IV Push every 12 hours  piperacillin/tazobactam IVPB.. 3.375 Gram(s) IV Intermittent every 6 hours  sacubitril 24 mG/valsartan 26 mG 1 Tablet(s) Oral two times a day  vancomycin  IVPB 750 milliGRAM(s) IV Intermittent every 24 hours  warfarin 5 milliGRAM(s) Oral once      Vital Signs Last 24 Hrs  T(C): 37.1 (25 Dec 2021 06:27), Max: 37.1 (24 Dec 2021 22:09)  T(F): 98.8 (25 Dec 2021 06:27), Max: 98.8 (25 Dec 2021 06:27)  HR: 54 (25 Dec 2021 08:50) (54 - 97)  BP: 113/53 (25 Dec 2021 08:50) (98/48 - 127/58)  BP(mean): 77 (25 Dec 2021 08:50) (77 - 84)  RR: 19 (25 Dec 2021 08:50) (16 - 22)  SpO2: 96% (25 Dec 2021 08:50) (95% - 100%)  I&O's Detail    24 Dec 2021 07:01  -  25 Dec 2021 07:00  --------------------------------------------------------  IN:    Oral Fluid: 180 mL    Other (mL): 875 mL  Total IN: 1055 mL    OUT:    Voided (mL): 800 mL  Total OUT: 800 mL    Total NET: 255 mL          Physical Exam:  General: No acute distress, resting comfortably in bed  C/V: normal sinus rhythm  Pulm: Nonlabored breathing, no respiratory distress  Abd: soft, non-tender, non-distended.  Extrem: warm and well perfused, no edema. Palpable pedal pulses bilaterally. 10cm circular necrotic shin wound on LLE; no drainage, pus, malodor noted.     LABS:                        9.4    6.93  )-----------( 193      ( 25 Dec 2021 06:54 )             29.5     12-25    135  |  97  |  15  ----------------------------<  124<H>  3.6   |  29  |  0.99    Ca    8.5      25 Dec 2021 06:54  Phos  3.3     12-25  Mg     1.9     12-25    TPro  6.6  /  Alb  4.2  /  TBili  0.4  /  DBili  x   /  AST  43<H>  /  ALT  20  /  AlkPhos  45  12-23    PT/INR - ( 25 Dec 2021 06:54 )   PT: 18.0 sec;   INR: 1.53          PTT - ( 25 Dec 2021 06:54 )  PTT:87.4 sec      RADIOLOGY & ADDITIONAL STUDIES:          ---------------------------------------------------------------------------  PLEASE CHECK WHEN PRESENT:  [x ] Heart Failure  [  ] Acute  [  ] Acute on Chronic  [x ] Chronic  -------------------------------------------------------------------  [  ]Diastolic [HFpEF]  [  ]Systolic [HFrEF]  [  ]Combined [HFpEF & HFrEF]  [  ] afib  [x ] hypertensive heart disease  [ x ]Other: Rheumatic fever, prosthetic heart valve   -------------------------------------------------------------------  [ ] Respiratory failure  [ ] Acute cor pulmonale  [ ] Asthma/COPD Exacerbation  [ ] Pleural effusion  [ ] Aspiration pneumonia  -------------------------------------------------------------------  [  ]EMERY  [  ]ATN  [  ]Reneal Medullary Necrosis  [  ]Renal Cortical Necrosis  [  ]Other Pathological Lesions:    [  ]CKD 1  [  ]CKD 2  [  ]CKD 3  [  ]CKD 4  [  ]CKD 5  [  ]Other  -------------------------------------------------------------------  [  ]Diabetes  [  ] Diabetic PVD Ulcer  [  ] Neuropathic ulcer to DM  [  ] Diabetes with Nephropathy  [  ] Osteomyelitis due to diabetes  --------------------------------------------------------------------  [  ]Malnutrition: See Nutrition Note  [  ]Cachexia  [  ]Other:   [  ]Supplement Ordered:  [  ]Morbid Obesity (BMI >=40]  ---------------------------------------------------------------------  [ ] Sepsis/severe sepsis/septic shock  [ ] UTI  [ ] Pneumonia  -----------------------------------------------------------------------  [ ] Acidosis/alkalosis  [ ] Fluid overload  [ ] Hypokalemia  [ ] Hyperkalemia  [ ] Hypomagnesemia  [ ] Hypophosphatemia  [ ] Hyperphosphatemia  ------------------------------------------------------------------------  [ ] Acute blood loss anemia  [ ] Post op blood loss anemia  [ ] Iron deficiency anemia  [ ] Anemia due to chronic disease  [ ] Hypercoagulable state  ----------------------------------------------------------------------  [ ] Cerebral infarction  [ ] Transient ischemia attack  [ ] Encephalopathy      Assessment  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    Vascular/PAD:  -s/p LLE wound debridement (12/24/21 )  -Wound care to L shin daily with xeroform and kerlix  -Compression and elevation to LLE  - Pain control prn    MVR:   -Restarting coumadin 12/25; Lovenox to coumadin bridge    HTN/HLD:  -Continue Carvedilol  -Continue Rosuvastatin (therapeutic interchange)     CAD/CHF:  -Continue Digoxin  -Continue Entresto   -Continue Furosemide    Hypothyroidism:  -Continue Synthroid     ID:  -Vancomycin/Zosyn for infected LE wound; consider downgrading today given negative cultures and no pus  - Right foot X-ray for pain     Diet:   -DASH diet  -NPO for procedure    Activity:   -OOBA    DVTPPx:   -Hep gtt  -No SCDs    Dispo: TBD

## 2021-12-25 NOTE — PHYSICAL THERAPY INITIAL EVALUATION ADULT - PERTINENT HX OF CURRENT PROBLEM, REHAB EVAL
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

## 2021-12-25 NOTE — PROGRESS NOTE ADULT - SUBJECTIVE AND OBJECTIVE BOX
[facility] P[facility] IM    INTERVAL HPI/OVERNIGHT EVENTS:    now on enoxaparin  pain is controlled now, last night     PAST MEDICAL & SURGICAL HISTORY:  Rheumatic fever    Hypertension    Hyperlipidemia    Hypothyroidism    Congestive heart failure    History of mitral valve replacement    FAMILY HISTORY:  fa 48 AMI  mother 50s AMI    SOCIAL HISTORY:  no alcohol  smoking in the 30s    REVIEW OF SYSTEMS:  Constitutional: (-) weight change, () fever,  () chills, () fatigue, () night sweats  Eyes: (-) discharge, () eye pain, () visual change  ENT:  (-) hearing difficulty, () vertigo, () sinus pain,  () throat pain, () epistaxis, () dysphagia, () hoarseness  Neck: (-) pain, () stiffness, () swelling  Respiratory: (-) cough, () wheezing, () hemoptysis      Cardiovascular: (-) chest pain, ()palpitations, () dizziness   Gastrointestinal: (-) abdominal pain, () nausea, () vomiting, () hematemesis, () diarrhea,  (-) constipation, () melena  Genitourinary:  (-) dysuria, () frequency, () hematuria, () incontinence      Neurologic: (-) headache, () memory loss, () loss of strength, () numbness, () tremor     Skin: (-) itching, () burning, () rash, () lesions   Lymphatic: (-) enlarged lymph nodes  Endocrine: (-) hair loss, () temperature intolerance         Musculoskeletal: (-) back pain, () joint pain,  () extremity pain  Psychiatric: () visual change, (-) auditory change, () depression, () anxiety, () suicidal  Sleep: (-) disorder, () insomnia, () sleep deprivation  Heme/Lymph: (-) easy bruising, () bleeding gums            Allergy and Immunologic: () hives, () eczema    Vital Signs Last 24 Hrs  T(C): 35.8 (25 Dec 2021 13:19), Max: 37.1 (24 Dec 2021 22:09)  T(F): 96.4 (25 Dec 2021 13:19), Max: 98.8 (25 Dec 2021 06:27)  HR: 50 (25 Dec 2021 13:11) (50 - 78)  BP: 104/51 (25 Dec 2021 13:11) (88/49 - 113/56)  BP(mean): 74 (25 Dec 2021 13:11) (64 - 81)  RR: 19 (25 Dec 2021 13:11) (16 - 19)  SpO2: 100% (25 Dec 2021 13:11) (94% - 100%)    I&O's Detail    24 Dec 2021 07:01  -  25 Dec 2021 07:00  --------------------------------------------------------  IN:    Oral Fluid: 180 mL    Other (mL): 875 mL  Total IN: 1055 mL    OUT:    Voided (mL): 800 mL  Total OUT: 800 mL    Total NET: 255 mL      25 Dec 2021 07:01  -  25 Dec 2021 15:22  --------------------------------------------------------  IN:    Oral Fluid: 360 mL  Total IN: 360 mL    OUT:    Voided (mL): 500 mL  Total OUT: 500 mL    Total NET: -140 mL    PHYSICAL EXAM:    Well nourished, well developed, comfortable with pain medication, - acute distress; vital signs are monitored  Eyes: PERRLA, EOMI, -conjunctivitis, -scleritis   Head: no focal deficit, normocephalic,  no trauma  ENMT: moist tongue, no thrush, -nasal discharge, -hoarseness, normal hearing, -cough, -hemoptysis, trachea midline  Neck: supple, - lymphadenopathy,  -masses, -JVD  Respiratory: bilateral diminished breath sounds, -wheezing, -rhonchi, -rales, -crackles  Chest: -accessory muscle use, -paradoxical breathing  Cardiovascular: irregular rate and afib, S1 S2 normal, -S3, -S4, -murmur, -gallop, -rub  Gastrointestinal: soft, nontender, nondistended, normal bowel sounds, no hepatosplenomegaly  Genitourinary: -flank pain, -dysuria  Extremities: -clubbing, -cyanosis, -edema    Vascular: peripheral pulses palpable 2+ bilaterally  Neurological: alert, oriented x 3, no focal deficit, -tremor   Skin: warm, dry, -erythema, iv sites intact  Lymph nodes; no cervical, supraclavicular or axillary adenopathy  Psychiatric: cooperative, appropriate mood      MEDICATIONS  (STANDING):  atorvastatin 20 milliGRAM(s) Oral at bedtime  carvedilol 3.125 milliGRAM(s) Oral every 12 hours  ceFAZolin   IVPB      ceFAZolin   IVPB 1000 milliGRAM(s) IV Intermittent every 8 hours  digoxin     Tablet 125 MICROGram(s) Oral every 12 hours  enoxaparin Injectable 60 milliGRAM(s) SubCutaneous every 12 hours  furosemide   Injectable 20 milliGRAM(s) IV Push every 12 hours  levothyroxine 75 MICROGram(s) Oral <User Schedule>  levothyroxine 100 MICROGram(s) Oral <User Schedule>  sacubitril 24 mG/valsartan 26 mG 1 Tablet(s) Oral two times a day  warfarin 5 milliGRAM(s) Oral once    MEDICATIONS  (PRN):  metoclopramide Injectable 10 milliGRAM(s) IV Push once PRN nausea/vomiting  oxycodone    5 mG/acetaminophen 325 mG 1 Tablet(s) Oral every 6 hours PRN Moderate Pain (4 - 6)      Allergies    No Known Allergies    Intolerances    LABS:                        9.4    6.93  )-----------( 193      ( 25 Dec 2021 06:54 )             29.5     12-25    135  |  97  |  15  ----------------------------<  124<H>  3.6   |  29  |  0.99    Ca    8.5      25 Dec 2021 06:54  Phos  3.3     12-25  Mg     1.9     12-25    TPro  6.6  /  Alb  4.2  /  TBili  0.4  /  DBili  x   /  AST  43<H>  /  ALT  20  /  AlkPhos  45  12-23    PT/INR - ( 25 Dec 2021 06:54 )   PT: 18.0 sec;   INR: 1.53     PTT - ( 25 Dec 2021 06:54 )  PTT:87.4 sec    +DVT prophylaxis OC  +Sleep    +Nutrition goals  ORAL  -Pain  CONTINUE TYLENOL, AVOID OXYCODON  -Decubital ulcer  +GI prophylaxis (PPV, coagulopathy, Hx)  +Aspiration prophylaxis (45 degrees)  +Sedation/analgesia stopped once  +ID (phos, CH, mupi, SB)  -Delirium  +Cardiac Beta/ACEI-ARB/ASA  OFF /statin  +Prevention  +Education  +Medication reviewed (drug-drug interactions, PDA)  Medical devices  Discussed with PGY, RN    RADIOLOGY & ADDITIONAL STUDIES:    CXR with cardiomegaly    XR calcaneus without osteomyelitis

## 2021-12-25 NOTE — PROGRESS NOTE ADULT - ASSESSMENT
81F w/ HTN, HLD, sCHF, hypothyroidism, rheumatic fever s/p MVR (2000 on coumadin), pAFib/flutter who p/w worsening L shin wound. Cardiology consulted for atrial flutter after LLE wound debridement.    #Atrial flutter w/ variable block  - Pt with known atrial flutter. She is currently rate controlled and asymptomatic. Was likely triggered by volume status. She was started on lasix 20mg IV BID.   - Continue lasix 20mg IV BID today with plans to switch to home PO regimen tomorrow   - Continue rest of home meds: Coreg 3.125 mg BID, Entresto 24/26 BID, Digoxin 0.125 qd ( Please change order to qd which is home dose)     #Rheumatic fever s/p MVR   - Now being bridged with Lovenox 50mg IV BID to get back on home coumadin dose  - Daily INR checks    Case discussed with attending. Cardiology to follow.  81F w/ HTN, HLD, sCHF, hypothyroidism, rheumatic fever s/p MVR (2000 on coumadin), pAFib/flutter who p/w worsening L shin wound. Cardiology consulted for atrial flutter after LLE wound debridement.    #Atrial flutter w/ variable block  - Pt with known atrial flutter. She is currently rate controlled and asymptomatic. Was likely triggered by volume status. She was started on lasix 20mg IV BID.   - Continue lasix 20mg IV BID today with plans to switch to home PO regimen tomorrow   - Given bradycardia, please hold digoxin for now. Get dig level in the AM   - Continue rest of home meds: Coreg 3.125 mg BID, Entresto 24/26 BID  #Rheumatic fever s/p MVR   - Now being bridged with Lovenox 50mg IV BID to get back on home coumadin dose  - Daily INR checks    Case discussed with attending. Cardiology to follow.

## 2021-12-25 NOTE — PROGRESS NOTE ADULT - SUBJECTIVE AND OBJECTIVE BOX
INTERVAL EVENTS: No acute events. Feels better today. Denies any chest pain/ palpitations/ peripheral edema. Has some pain over LLE.     PAST MEDICAL & SURGICAL HISTORY:  Rheumatic fever    Hypertension    Hyperlipidemia    Hypothyroidism    Congestive heart failure    History of mitral valve replacement        MEDICATIONS  (STANDING):  atorvastatin 20 milliGRAM(s) Oral at bedtime  carvedilol 3.125 milliGRAM(s) Oral every 12 hours  ceFAZolin   IVPB      ceFAZolin   IVPB 1000 milliGRAM(s) IV Intermittent every 8 hours  digoxin     Tablet 125 MICROGram(s) Oral every 12 hours  enoxaparin Injectable 60 milliGRAM(s) SubCutaneous every 12 hours  furosemide   Injectable 20 milliGRAM(s) IV Push every 12 hours  levothyroxine 75 MICROGram(s) Oral <User Schedule>  levothyroxine 100 MICROGram(s) Oral <User Schedule>  sacubitril 24 mG/valsartan 26 mG 1 Tablet(s) Oral two times a day  warfarin 5 milliGRAM(s) Oral once    MEDICATIONS  (PRN):  metoclopramide Injectable 10 milliGRAM(s) IV Push once PRN nausea/vomiting  oxycodone    5 mG/acetaminophen 325 mG 1 Tablet(s) Oral every 6 hours PRN Moderate Pain (4 - 6)    Vital Signs Last 24 Hrs  T(C): 36.7 (25 Dec 2021 09:58), Max: 37.1 (24 Dec 2021 22:09)  T(F): 98.1 (25 Dec 2021 09:58), Max: 98.8 (25 Dec 2021 06:27)  HR: 64 (25 Dec 2021 09:48) (54 - 97)  BP: 113/56 (25 Dec 2021 09:48) (98/48 - 122/65)  BP(mean): 81 (25 Dec 2021 09:48) (77 - 81)  RR: 19 (25 Dec 2021 09:48) (16 - 19)  SpO2: 95% (25 Dec 2021 09:48) (95% - 97%)    PHYSICAL EXAM:  GEN: Awake, alert. NAD.   HEENT: NCAT, PERRL, EOMI. Mucosa moist. No JVD.  RESP: CTA b/l  CV: RRR. Normal S1/S2. No m/r/g.  ABD: Soft. NT/ND. BS+  EXT: Warm. Trace edema bilaterally   NEURO: AAOx3. No focal deficits.     LABS:                        9.4    6.93  )-----------( 193      ( 25 Dec 2021 06:54 )             29.5     12-25    135  |  97  |  15  ----------------------------<  124<H>  3.6   |  29  |  0.99    Ca    8.5      25 Dec 2021 06:54  Phos  3.3     12-25  Mg     1.9     12-25    TPro  6.6  /  Alb  4.2  /  TBili  0.4  /  DBili  x   /  AST  43<H>  /  ALT  20  /  AlkPhos  45  12-23        PT/INR - ( 25 Dec 2021 06:54 )   PT: 18.0 sec;   INR: 1.53          PTT - ( 25 Dec 2021 06:54 )  PTT:87.4 sec    I&O's Summary    24 Dec 2021 07:01  -  25 Dec 2021 07:00  --------------------------------------------------------  IN: 1055 mL / OUT: 800 mL / NET: 255 mL    25 Dec 2021 07:01  -  25 Dec 2021 11:32  --------------------------------------------------------  IN: 0 mL / OUT: 300 mL / NET: -300 mL      RADIOLOGY & ADDITIONAL STUDIES:  TELEMETRY:  EKG:         INTERVAL EVENTS: No acute events. Feels better today. Denies any chest pain/ palpitations/ peripheral edema. Has some pain over LLE.     PAST MEDICAL & SURGICAL HISTORY:  Rheumatic fever    Hypertension    Hyperlipidemia    Hypothyroidism    Congestive heart failure    History of mitral valve replacement        MEDICATIONS  (STANDING):  atorvastatin 20 milliGRAM(s) Oral at bedtime  carvedilol 3.125 milliGRAM(s) Oral every 12 hours  ceFAZolin   IVPB      ceFAZolin   IVPB 1000 milliGRAM(s) IV Intermittent every 8 hours  digoxin     Tablet 125 MICROGram(s) Oral every 12 hours  enoxaparin Injectable 60 milliGRAM(s) SubCutaneous every 12 hours  furosemide   Injectable 20 milliGRAM(s) IV Push every 12 hours  levothyroxine 75 MICROGram(s) Oral <User Schedule>  levothyroxine 100 MICROGram(s) Oral <User Schedule>  sacubitril 24 mG/valsartan 26 mG 1 Tablet(s) Oral two times a day  warfarin 5 milliGRAM(s) Oral once    MEDICATIONS  (PRN):  metoclopramide Injectable 10 milliGRAM(s) IV Push once PRN nausea/vomiting  oxycodone    5 mG/acetaminophen 325 mG 1 Tablet(s) Oral every 6 hours PRN Moderate Pain (4 - 6)    Vital Signs Last 24 Hrs  T(C): 36.7 (25 Dec 2021 09:58), Max: 37.1 (24 Dec 2021 22:09)  T(F): 98.1 (25 Dec 2021 09:58), Max: 98.8 (25 Dec 2021 06:27)  HR: 64 (25 Dec 2021 09:48) (54 - 97)  BP: 113/56 (25 Dec 2021 09:48) (98/48 - 122/65)  BP(mean): 81 (25 Dec 2021 09:48) (77 - 81)  RR: 19 (25 Dec 2021 09:48) (16 - 19)  SpO2: 95% (25 Dec 2021 09:48) (95% - 97%)    PHYSICAL EXAM:  GEN: Awake, alert. NAD.   HEENT: NCAT, PERRL, EOMI. Mucosa moist. No JVD.  RESP: CTA b/l  CV: RRR. Normal S1/S2. No m/r/g.  ABD: Soft. NT/ND. BS+  EXT: Warm. Trace edema bilaterally   NEURO: AAOx3. No focal deficits.     LABS:                        9.4    6.93  )-----------( 193      ( 25 Dec 2021 06:54 )             29.5     12-25    135  |  97  |  15  ----------------------------<  124<H>  3.6   |  29  |  0.99    Ca    8.5      25 Dec 2021 06:54  Phos  3.3     12-25  Mg     1.9     12-25    TPro  6.6  /  Alb  4.2  /  TBili  0.4  /  DBili  x   /  AST  43<H>  /  ALT  20  /  AlkPhos  45  12-23        PT/INR - ( 25 Dec 2021 06:54 )   PT: 18.0 sec;   INR: 1.53          PTT - ( 25 Dec 2021 06:54 )  PTT:87.4 sec    I&O's Summary    24 Dec 2021 07:01  -  25 Dec 2021 07:00  --------------------------------------------------------  IN: 1055 mL / OUT: 800 mL / NET: 255 mL    25 Dec 2021 07:01  -  25 Dec 2021 11:32  --------------------------------------------------------  IN: 0 mL / OUT: 300 mL / NET: -300 mL    Tele: Aflutter with variable block

## 2021-12-25 NOTE — PHYSICAL THERAPY INITIAL EVALUATION ADULT - GENERAL OBSERVATIONS, REHAB EVAL
Patient received ambulating in hallway in NAD on RA, +Telemetry, +Heplock. Cleared by DARI Portillo. Agreeable to PT.

## 2021-12-26 ENCOUNTER — TRANSCRIPTION ENCOUNTER (OUTPATIENT)
Age: 81
End: 2021-12-26

## 2021-12-26 DIAGNOSIS — J90 PLEURAL EFFUSION, NOT ELSEWHERE CLASSIFIED: ICD-10-CM

## 2021-12-26 DIAGNOSIS — I00 RHEUMATIC FEVER WITHOUT HEART INVOLVEMENT: ICD-10-CM

## 2021-12-26 DIAGNOSIS — I10 ESSENTIAL (PRIMARY) HYPERTENSION: ICD-10-CM

## 2021-12-26 DIAGNOSIS — E78.5 HYPERLIPIDEMIA, UNSPECIFIED: ICD-10-CM

## 2021-12-26 DIAGNOSIS — D62 ACUTE POSTHEMORRHAGIC ANEMIA: ICD-10-CM

## 2021-12-26 DIAGNOSIS — Z95.2 PRESENCE OF PROSTHETIC HEART VALVE: ICD-10-CM

## 2021-12-26 DIAGNOSIS — I48.20 CHRONIC ATRIAL FIBRILLATION, UNSPECIFIED: ICD-10-CM

## 2021-12-26 DIAGNOSIS — E03.9 HYPOTHYROIDISM, UNSPECIFIED: ICD-10-CM

## 2021-12-26 DIAGNOSIS — I50.9 HEART FAILURE, UNSPECIFIED: ICD-10-CM

## 2021-12-26 DIAGNOSIS — B95.7 OTHER STAPHYLOCOCCUS AS THE CAUSE OF DISEASES CLASSIFIED ELSEWHERE: ICD-10-CM

## 2021-12-26 LAB
ANION GAP SERPL CALC-SCNC: 8 MMOL/L — SIGNIFICANT CHANGE UP (ref 5–17)
BUN SERPL-MCNC: 20 MG/DL — SIGNIFICANT CHANGE UP (ref 7–23)
CALCIUM SERPL-MCNC: 9.1 MG/DL — SIGNIFICANT CHANGE UP (ref 8.4–10.5)
CHLORIDE SERPL-SCNC: 102 MMOL/L — SIGNIFICANT CHANGE UP (ref 96–108)
CO2 SERPL-SCNC: 32 MMOL/L — HIGH (ref 22–31)
CREAT SERPL-MCNC: 0.85 MG/DL — SIGNIFICANT CHANGE UP (ref 0.5–1.3)
DIGOXIN SERPL-MCNC: 1.7 NG/ML — SIGNIFICANT CHANGE UP (ref 0.8–2)
GLUCOSE SERPL-MCNC: 109 MG/DL — HIGH (ref 70–99)
HCT VFR BLD CALC: 32.3 % — LOW (ref 34.5–45)
HGB BLD-MCNC: 10.1 G/DL — LOW (ref 11.5–15.5)
INR BLD: 1.33 — HIGH (ref 0.88–1.16)
MAGNESIUM SERPL-MCNC: 2.2 MG/DL — SIGNIFICANT CHANGE UP (ref 1.6–2.6)
MCHC RBC-ENTMCNC: 30.8 PG — SIGNIFICANT CHANGE UP (ref 27–34)
MCHC RBC-ENTMCNC: 31.3 GM/DL — LOW (ref 32–36)
MCV RBC AUTO: 98.5 FL — SIGNIFICANT CHANGE UP (ref 80–100)
NRBC # BLD: 0 /100 WBCS — SIGNIFICANT CHANGE UP (ref 0–0)
PHOSPHATE SERPL-MCNC: 2.5 MG/DL — SIGNIFICANT CHANGE UP (ref 2.5–4.5)
PLATELET # BLD AUTO: 202 K/UL — SIGNIFICANT CHANGE UP (ref 150–400)
POTASSIUM SERPL-MCNC: 3.9 MMOL/L — SIGNIFICANT CHANGE UP (ref 3.5–5.3)
POTASSIUM SERPL-SCNC: 3.9 MMOL/L — SIGNIFICANT CHANGE UP (ref 3.5–5.3)
PROTHROM AB SERPL-ACNC: 15.7 SEC — HIGH (ref 10.6–13.6)
RBC # BLD: 3.28 M/UL — LOW (ref 3.8–5.2)
RBC # FLD: 17 % — HIGH (ref 10.3–14.5)
SODIUM SERPL-SCNC: 142 MMOL/L — SIGNIFICANT CHANGE UP (ref 135–145)
WBC # BLD: 4.99 K/UL — SIGNIFICANT CHANGE UP (ref 3.8–10.5)
WBC # FLD AUTO: 4.99 K/UL — SIGNIFICANT CHANGE UP (ref 3.8–10.5)

## 2021-12-26 PROCEDURE — 99232 SBSQ HOSP IP/OBS MODERATE 35: CPT | Mod: GC

## 2021-12-26 RX ORDER — WARFARIN SODIUM 2.5 MG/1
5 TABLET ORAL ONCE
Refills: 0 | Status: COMPLETED | OUTPATIENT
Start: 2021-12-26 | End: 2021-12-26

## 2021-12-26 RX ORDER — POTASSIUM PHOSPHATE, MONOBASIC POTASSIUM PHOSPHATE, DIBASIC 236; 224 MG/ML; MG/ML
15 INJECTION, SOLUTION INTRAVENOUS ONCE
Refills: 0 | Status: COMPLETED | OUTPATIENT
Start: 2021-12-26 | End: 2021-12-26

## 2021-12-26 RX ORDER — POTASSIUM CHLORIDE 20 MEQ
10 PACKET (EA) ORAL ONCE
Refills: 0 | Status: COMPLETED | OUTPATIENT
Start: 2021-12-26 | End: 2021-12-26

## 2021-12-26 RX ORDER — FUROSEMIDE 40 MG
20 TABLET ORAL DAILY
Refills: 0 | Status: DISCONTINUED | OUTPATIENT
Start: 2021-12-26 | End: 2021-12-27

## 2021-12-26 RX ADMIN — Medication 100 MILLIGRAM(S): at 05:30

## 2021-12-26 RX ADMIN — ATORVASTATIN CALCIUM 20 MILLIGRAM(S): 80 TABLET, FILM COATED ORAL at 21:25

## 2021-12-26 RX ADMIN — POTASSIUM PHOSPHATE, MONOBASIC POTASSIUM PHOSPHATE, DIBASIC 62.5 MILLIMOLE(S): 236; 224 INJECTION, SOLUTION INTRAVENOUS at 11:16

## 2021-12-26 RX ADMIN — CARVEDILOL PHOSPHATE 3.12 MILLIGRAM(S): 80 CAPSULE, EXTENDED RELEASE ORAL at 09:01

## 2021-12-26 RX ADMIN — Medication 100 MILLIGRAM(S): at 21:26

## 2021-12-26 RX ADMIN — Medication 20 MILLIGRAM(S): at 05:31

## 2021-12-26 RX ADMIN — Medication 100 MILLIGRAM(S): at 13:53

## 2021-12-26 RX ADMIN — Medication 20 MILLIGRAM(S): at 09:01

## 2021-12-26 RX ADMIN — Medication 75 MICROGRAM(S): at 05:31

## 2021-12-26 RX ADMIN — WARFARIN SODIUM 5 MILLIGRAM(S): 2.5 TABLET ORAL at 19:03

## 2021-12-26 RX ADMIN — Medication 650 MILLIGRAM(S): at 09:01

## 2021-12-26 RX ADMIN — ENOXAPARIN SODIUM 60 MILLIGRAM(S): 100 INJECTION SUBCUTANEOUS at 09:03

## 2021-12-26 RX ADMIN — Medication 650 MILLIGRAM(S): at 22:43

## 2021-12-26 RX ADMIN — Medication 650 MILLIGRAM(S): at 13:45

## 2021-12-26 RX ADMIN — SACUBITRIL AND VALSARTAN 1 TABLET(S): 24; 26 TABLET, FILM COATED ORAL at 05:31

## 2021-12-26 RX ADMIN — Medication 10 MILLIEQUIVALENT(S): at 09:01

## 2021-12-26 RX ADMIN — SACUBITRIL AND VALSARTAN 1 TABLET(S): 24; 26 TABLET, FILM COATED ORAL at 17:48

## 2021-12-26 RX ADMIN — Medication 650 MILLIGRAM(S): at 21:25

## 2021-12-26 RX ADMIN — Medication 650 MILLIGRAM(S): at 10:11

## 2021-12-26 NOTE — DISCHARGE NOTE PROVIDER - HOSPITAL COURSE
81yoF with PMH HTN, HLD, CHF, hypothyroidism, rheumatic fever and 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 Patient states she was in [place] at a birthday celebration on 12/4/21 and had 2 glasses of wine, does not remember bumping her leg but started to notice blood dripping down her leg and pooling, then noted a L shin wound. Taken to ED there 2/2 bleeding and coumadin anticoagulation was help. She underwent wound care and antibiotic treatment and restarted her coumadin without issue. Today she saw Dr. Albarado in his office and was found to have necrotic black tissue on the shin wound with surrounding warmth and erythema. Sent in to ED for further management. On 12/24, she underwent debridement of L shin wound. She tolerated the procedure well and was transferred to the PACU in stable condition. 81yoF with PMH HTN, HLD, CHF, hypothyroidism, rheumatic fever and 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 Patient states she was in [place] at a birthday celebration on 12/4/21 and had 2 glasses of wine, does not remember bumping her leg but started to notice blood dripping down her leg and pooling, then noted a L shin wound. Taken to ED there 2/2 bleeding and coumadin anticoagulation was help. She underwent wound care and antibiotic treatment and restarted her coumadin without issue. Today she saw Dr. Albarado in his office and was found to have necrotic black tissue on the shin wound with surrounding warmth and erythema. Sent in to ED for further management. On 12/24, she underwent debridement of L shin wound. She tolerated the procedure well and was transferred to the PACU in stable condition.     On day of discharge patient was stable to be d/c'd home with VNS. 81yoF with PMH HTN, HLD, CHF, hypothyroidism, rheumatic fever and 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 Patient states she was in [place] at a birthday celebration on 12/4/21 and had 2 glasses of wine, does not remember bumping her leg but started to notice blood dripping down her leg and pooling, then noted a L shin wound. Taken to ED there 2/2 bleeding and coumadin anticoagulation was help. She underwent wound care and antibiotic treatment and restarted her coumadin without issue. Today she saw Dr. Albarado in his office and was found to have necrotic black tissue on the shin wound with surrounding warmth and erythema. Sent in to ED for further management. On 12/24, she underwent debridement of L shin wound. She tolerated the procedure well and was transferred to the PACU in stable condition.     On day of discharge patient was stable to be d/c'd home with VNS.     ***Patient refusing to stay until her INR is therapeutic at 2.5 even though cardiology wants her to stay.  She has a friend who is a nurse that lives across the street who will come twice a day to give her the lovenox injections until she is therapeutic.  Dr. Beck told her to take coumadin 7.5mg orally tonight since her INR today is 1.49.  She also called her cardiologist Dr. García Gutierrez office and she will be going to have her INR check first thing tomorrow morning (12/28).  Then Dr. Beck will follow up and adjust her coumadin dose as needed.

## 2021-12-26 NOTE — PROGRESS NOTE ADULT - SUBJECTIVE AND OBJECTIVE BOX
[facility] PUD IM    INTERVAL HPI/OVERNIGHT EVENTS:    now on enoxaparin  pain is at 5/10  mild oozing  she is very concerned about help at home   wants VNS    All new data reviewed, including VS, lab, imaging, documentation and Rx.    On iv cephalosporin    PAST MEDICAL & SURGICAL HISTORY:  Rheumatic fever    Hypertension    Hyperlipidemia    Hypothyroidism    Congestive heart failure    History of mitral valve replacement    FAMILY HISTORY:  fa 48 AMI  mother 50s AMI    SOCIAL HISTORY:  no alcohol  smoking in the 30s    REVIEW OF SYSTEMS:  Constitutional: (-) weight change, () fever,  () chills, () fatigue, () night sweats  Eyes: (-) discharge, () eye pain, () visual change  ENT:  (-) hearing difficulty, () vertigo, () sinus pain,  () throat pain, () epistaxis, () dysphagia, () hoarseness  Neck: (-) pain, () stiffness, () swelling  Respiratory: (-) cough, () wheezing, () hemoptysis      Cardiovascular: (-) chest pain, ()palpitations, () dizziness   Gastrointestinal: (-) abdominal pain, () nausea, () vomiting, () hematemesis, () diarrhea,  (-) constipation, () melena  Genitourinary:  (-) dysuria, () frequency, () hematuria, () incontinence      Neurologic: (-) headache, () memory loss, () loss of strength, () numbness, () tremor     Skin: (-) itching, () burning, () rash, () lesions   Lymphatic: (-) enlarged lymph nodes  Endocrine: (-) hair loss, () temperature intolerance         Musculoskeletal: (-) back pain, () joint pain,  () extremity pain  Psychiatric: () visual change, (-) auditory change, () depression, () anxiety, () suicidal  Sleep: (-) disorder, () insomnia, () sleep deprivation  Heme/Lymph: (-) easy bruising, () bleeding gums            Allergy and Immunologic: () hives, () eczema    Vital Signs Last 24 Hrs  T(C): 35.8 (25 Dec 2021 13:19), Max: 37.1 (24 Dec 2021 22:09)  T(F): 96.4 (25 Dec 2021 13:19), Max: 98.8 (25 Dec 2021 06:27)  HR: 50 (25 Dec 2021 13:11) (50 - 78)  BP: 104/51 (25 Dec 2021 13:11) (88/49 - 113/56)  BP(mean): 74 (25 Dec 2021 13:11) (64 - 81)  RR: 19 (25 Dec 2021 13:11) (16 - 19)  SpO2: 100% (25 Dec 2021 13:11) (94% - 100%)    I&O's Detail    24 Dec 2021 07:01  -  25 Dec 2021 07:00  --------------------------------------------------------  IN:    Oral Fluid: 180 mL    Other (mL): 875 mL  Total IN: 1055 mL    OUT:    Voided (mL): 800 mL  Total OUT: 800 mL    Total NET: 255 mL      25 Dec 2021 07:01  -  25 Dec 2021 15:22  --------------------------------------------------------  IN:    Oral Fluid: 360 mL  Total IN: 360 mL    OUT:    Voided (mL): 500 mL  Total OUT: 500 mL    Total NET: -140 mL    PHYSICAL EXAM:    Well nourished, well developed, comfortable with pain medication, - acute distress; vital signs are monitored  Eyes: PERRLA, EOMI, -conjunctivitis, -scleritis   Head: no focal deficit, normocephalic,  no trauma  ENMT: moist tongue, no thrush, -nasal discharge, -hoarseness, normal hearing, -cough, -hemoptysis, trachea midline  Neck: supple, - lymphadenopathy,  -masses, -JVD  Respiratory: bilateral diminished breath sounds, -wheezing, -rhonchi, -rales, -crackles  Chest: -accessory muscle use, -paradoxical breathing  Cardiovascular: irregular rate and afib, S1 S2 normal, -S3, -S4, -murmur, -gallop, -rub  Gastrointestinal: soft, nontender, nondistended, normal bowel sounds, no hepatosplenomegaly  Genitourinary: -flank pain, -dysuria  Extremities: -clubbing, -cyanosis, -edema    Vascular: peripheral pulses palpable 2+ bilaterally  Neurological: alert, oriented x 3, no focal deficit, -tremor   Skin: warm, dry, -erythema, iv sites intact  Lymph nodes; no cervical, supraclavicular or axillary adenopathy  Psychiatric: cooperative, appropriate mood      MEDICATIONS  (STANDING):  atorvastatin 20 milliGRAM(s) Oral at bedtime  carvedilol 3.125 milliGRAM(s) Oral every 12 hours  ceFAZolin   IVPB      ceFAZolin   IVPB 1000 milliGRAM(s) IV Intermittent every 8 hours  digoxin     Tablet 125 MICROGram(s) Oral every 12 hours  enoxaparin Injectable 60 milliGRAM(s) SubCutaneous every 12 hours  furosemide   Injectable 20 milliGRAM(s) IV Push every 12 hours  levothyroxine 75 MICROGram(s) Oral <User Schedule>  levothyroxine 100 MICROGram(s) Oral <User Schedule>  sacubitril 24 mG/valsartan 26 mG 1 Tablet(s) Oral two times a day  warfarin 5 milliGRAM(s) Oral once    MEDICATIONS  (PRN):  metoclopramide Injectable 10 milliGRAM(s) IV Push once PRN nausea/vomiting  oxycodone    5 mG/acetaminophen 325 mG 1 Tablet(s) Oral every 6 hours PRN Moderate Pain (4 - 6)      Allergies    No Known Allergies    Intolerances    LABS:                        9.4    6.93  )-----------( 193      ( 25 Dec 2021 06:54 ) HB at 10.1             29.5     12-25    135  |  97  |  15  ----------------------------<  124<H>  3.6   |  29  |  0.99    Ca    8.5      25 Dec 2021 06:54  Phos  3.3     12-25  Mg     1.9     12-25    TPro  6.6  /  Alb  4.2  /  TBili  0.4  /  DBili  x   /  AST  43<H>  /  ALT  20  /  AlkPhos  45  12-23    PT/INR - ( 25 Dec 2021 06:54 )   PT: 18.0 sec;   INR: 1.53     PTT - ( 25 Dec 2021 06:54 )  PTT:87.4 sec    +DVT prophylaxis OC  +Sleep    +Nutrition goals  ORAL  -Pain  CONTINUE TYLENOL, OXYCODON discontinued  -Decubital ulcer  +GI prophylaxis (PPV, coagulopathy, Hx)  +Aspiration prophylaxis (45 degrees)  +Sedation/analgesia stopped once  +ID (phos, CH, mupi, SB)  -Delirium  +Cardiac Beta/ACEI-ARB/ASA  OFF /statin  +Prevention  +Education  +Medication reviewed (drug-drug interactions, PDA)  Medical devices  Discussed with PGY, RN    RADIOLOGY & ADDITIONAL STUDIES:    CXR with cardiomegaly    XR calcaneus without osteomyelitis

## 2021-12-26 NOTE — DISCHARGE NOTE PROVIDER - NSDCACTIVITY_GEN_ALL_CORE
No restrictions No restrictions/Do not drive or operate machinery/Showering allowed/Walking - Indoors allowed/No heavy lifting/straining/Walking - Outdoors allowed

## 2021-12-26 NOTE — DISCHARGE NOTE PROVIDER - NSDCCPTREATMENT_GEN_ALL_CORE_FT
PRINCIPAL PROCEDURE  Procedure: Debridement, subcutaneous tissue, muscle, or skin, lower leg or ankle  Findings and Treatment:

## 2021-12-26 NOTE — DISCHARGE NOTE PROVIDER - CARE PROVIDERS DIRECT ADDRESSES
[email or web address]

## 2021-12-26 NOTE — DISCHARGE NOTE PROVIDER - NSDCMRMEDTOKEN_GEN_ALL_CORE_FT
carvedilol 3.125 mg oral tablet: 1 tab(s) orally 2 times a day  Coumadin 5 mg oral tablet: 1 tab(s) orally once a day   6 days/week  digoxin 125 mcg (0.125 mg) oral tablet: 1 tab(s) orally 2 times a day  Entresto 24 mg-26 mg oral tablet: 1 tab(s) orally 2 times a day  furosemide 20 mg oral tablet: 1 tab(s) orally once a day  furosemide 40 mg oral tablet: 1 tab(s) orally once a day  rosuvastatin 5 mg oral tablet: 1 tab(s) orally once a day  Synthroid 75 mcg (0.075 mg) oral tablet: 1 tab(s) orally once a day alternate every other day with 100mcg   tramadol-acetaminophen 37.5 mg-325 mg oral tablet: 1 tab(s) orally 2 times a day   acetaminophen 325 mg oral tablet: 2 tab(s) orally every 6 hours, As needed, Mild Pain (1 - 3)  carvedilol 3.125 mg oral tablet: 1 tab(s) orally 2 times a day  Coumadin 5 mg oral tablet: 1 tab(s) orally once a day     digoxin 125 mcg (0.125 mg) oral tablet: 1 tab(s) orally 2 times a day  Entresto 24 mg-26 mg oral tablet: 1 tab(s) orally 2 times a day  furosemide 20 mg oral tablet: 1 tab(s) orally once a day  furosemide 40 mg oral tablet: 1 tab(s) orally once a day  rosuvastatin 5 mg oral tablet: 1 tab(s) orally once a day  Synthroid 75 mcg (0.075 mg) oral tablet: 1 tab(s) orally once a day alternate every other day with 100mcg   tramadol-acetaminophen 37.5 mg-325 mg oral tablet: 1 tab(s) orally 2 times a day   acetaminophen 325 mg oral tablet: 2 tab(s) orally every 6 hours, As needed, Mild Pain (1 - 3)  carvedilol 3.125 mg oral tablet: 1 tab(s) orally 2 times a day  Coumadin 5 mg oral tablet: 1 tab(s) orally once a day     digoxin 125 mcg (0.125 mg) oral tablet: 1 tab(s) orally 2 times a day  Entresto 24 mg-26 mg oral tablet: 1 tab(s) orally 2 times a day  furosemide 20 mg oral tablet: 1 tab(s) orally once a day  furosemide 40 mg oral tablet: 1 tab(s) orally once a day  rosuvastatin 5 mg oral tablet: 1 tab(s) orally once a day  Synthroid 75 mcg (0.075 mg) oral tablet: 1 tab(s) orally once a day alternate every other day with 100mcg    acetaminophen 325 mg oral tablet: 2 tab(s) orally every 6 hours, As needed, Mild Pain (1 - 3)  carvedilol 3.125 mg oral tablet: 1 tab(s) orally 2 times a day  Coumadin 5 mg oral tablet: 1 tab(s) orally once a day     digoxin 125 mcg (0.125 mg) oral tablet: 1 tab(s) orally 2 times a day  enoxaparin 60 mg/0.6 mL injectable solution: 60 milligram(s) subcutaneously every 12 hours   Entresto 24 mg-26 mg oral tablet: 1 tab(s) orally 2 times a day  furosemide 20 mg oral tablet: 1 tab(s) orally once a day  furosemide 40 mg oral tablet: 1 tab(s) orally once a day  rosuvastatin 5 mg oral tablet: 1 tab(s) orally once a day  Synthroid 75 mcg (0.075 mg) oral tablet: 1 tab(s) orally once a day alternate every other day with 100mcg

## 2021-12-26 NOTE — DISCHARGE NOTE PROVIDER - PROVIDER TOKENS
PROVIDER:[TOKEN:[[id number]]],PROVIDER:[TOKEN:[[id number]]] PROVIDER:[TOKEN:[[id number]],FOLLOWUP:[1-3 days]]

## 2021-12-26 NOTE — DISCHARGE NOTE PROVIDER - NSDCFUADDINST_GEN_ALL_CORE_FT
FOLLOW UP: Dr. Beck on Thursday 12/30. Call the office at  if you wish to follow up with your surgeon Dr. Snyder    WOUND CARE: Apply wet to dry dressing with saline soaked kerlix wrap around shin wound daily.     ACTIVITY: Ambulate as tolerated, but no heavy lifting (>10lbs) or strenuous exercise....    DIET:   You may resume regular diet. Call the office if you experience increasing pain, redness, swelling or drainage from incision sites/wounds, or temperature >101.4F. NEW MEDICATIONS:    DISCHARGE DESTINATION: Home FOLLOW UP: Dr. Beck on Thursday 12/30. Call the office at .    WOUND CARE: You may shower; soap and water over incision sites. Do not scrub. Pat dry when done.   Left leg wound- Apply wet to dry dressing with saline soaked Kerlix wrap around shin wound daily.     ACTIVITY: Ambulate as tolerated, but no heavy lifting (>10lbs) or strenuous exercise.  Do NOT bathe, swim, or hot tub until you are cleared by your doctor.     Call the office if you experience increasing pain, redness, swelling or drainage from incision sites/wounds, or temperature >101.4F.     NEW MEDICATIONS: Keflex 500mg every 6 hours for 10days    DISCHARGE DESTINATION: Home with VNS FOLLOW UP: Dr. Beck on Thursday 12/30. Call the office at .    WOUND CARE: You may shower; soap and water over incision sites. Do not scrub. Pat dry when done.   Left leg wound- Apply wet to dry dressing with saline soaked Kerlix wrap around shin wound daily.     ACTIVITY: Ambulate as tolerated, but no heavy lifting (>10lbs) or strenuous exercise.  Do NOT bathe, swim, or hot tub until you are cleared by your doctor.     Call the office if you experience increasing pain, redness, swelling or drainage from incision sites/wounds, or temperature >101.4F.     NEW MEDICATIONS: Lovenox 60mg twice a day until your have a therapeutic INR 2.5-3.5.  Coumadin 7.5mg tonight**** per Dr. Beck     ***Follow up with Dr. García Lara your cardiologist in the office tomorrow 12/28 morning to have your INR checked.  Then call Dr. Beck with the results so he can tell you what dose of coumadin to take tomorrow night.    DISCHARGE DESTINATION: Home with VNS

## 2021-12-26 NOTE — DISCHARGE NOTE PROVIDER - NSDCCPCAREPLAN_GEN_ALL_CORE_FT
PRINCIPAL DISCHARGE DIAGNOSIS  Diagnosis: Leg wound, left  Assessment and Plan of Treatment:        PRINCIPAL DISCHARGE DIAGNOSIS  Diagnosis: Leg wound, left  Assessment and Plan of Treatment:       SECONDARY DISCHARGE DIAGNOSES  Diagnosis: Hypertension  Assessment and Plan of Treatment:     Diagnosis: Hypothyroidism  Assessment and Plan of Treatment:     Diagnosis: S/P MVR (mitral valve replacement)  Assessment and Plan of Treatment:     Diagnosis: Anemia due to acute blood loss  Assessment and Plan of Treatment:     Diagnosis: Chronic atrial fibrillation  Assessment and Plan of Treatment:     Diagnosis: Systolic CHF, chronic  Assessment and Plan of Treatment:     Diagnosis: Aortic stenosis  Assessment and Plan of Treatment:

## 2021-12-26 NOTE — PROGRESS NOTE ADULT - SUBJECTIVE AND OBJECTIVE BOX
O/N: ELY, VSS. Continued sustained bradycardia, asymptomatic. AM carvedilol held.     ---------------------------------------------------------------------------  PLEASE CHECK WHEN PRESENT:  [x ] Heart Failure  [  ] Acute  [  ] Acute on Chronic  [x ] Chronic  -------------------------------------------------------------------  [  ]Diastolic [HFpEF]  [  ]Systolic [HFrEF]  [  ]Combined [HFpEF & HFrEF]  [  ] afib  [x ] hypertensive heart disease  [ x ]Other: Rheumatic fever, prosthetic heart valve   -------------------------------------------------------------------  [ ] Respiratory failure  [ ] Acute cor pulmonale  [ ] Asthma/COPD Exacerbation  [ ] Pleural effusion  [ ] Aspiration pneumonia  -------------------------------------------------------------------  [  ]EMERY  [  ]ATN  [  ]Reneal Medullary Necrosis  [  ]Renal Cortical Necrosis  [  ]Other Pathological Lesions:    [  ]CKD 1  [  ]CKD 2  [  ]CKD 3  [  ]CKD 4  [  ]CKD 5  [  ]Other  -------------------------------------------------------------------  [  ]Diabetes  [  ] Diabetic PVD Ulcer  [  ] Neuropathic ulcer to DM  [  ] Diabetes with Nephropathy  [  ] Osteomyelitis due to diabetes  --------------------------------------------------------------------  [  ]Malnutrition: See Nutrition Note  [  ]Cachexia  [  ]Other:   [  ]Supplement Ordered:  [  ]Morbid Obesity (BMI >=40]  ---------------------------------------------------------------------  [ ] Sepsis/severe sepsis/septic shock  [ ] UTI  [ ] Pneumonia  -----------------------------------------------------------------------  [ ] Acidosis/alkalosis  [ ] Fluid overload  [ ] Hypokalemia  [ ] Hyperkalemia  [ ] Hypomagnesemia  [ ] Hypophosphatemia  [ ] Hyperphosphatemia  ------------------------------------------------------------------------  [ ] Acute blood loss anemia  [ ] Post op blood loss anemia  [ ] Iron deficiency anemia  [ ] Anemia due to chronic disease  [ ] Hypercoagulable state  ----------------------------------------------------------------------  [ ] Cerebral infarction  [ ] Transient ischemia attack  [ ] Encephalopathy      Assessment  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    Vascular/PAD:  -s/p LLE wound debridement (12/24/21 )  -Wound care to L shin daily with xeroform and kerlix  -Compression and elevation to LLE  - Pain control prn    MVR:   -Restarted coumadin 12/25; Lovenox to coumadin bridge  -Daily INR    HTN/HLD:  -Continue Carvedilol  -Continue Rosuvastatin (therapeutic interchange)     CAD/CHF:  -Continue Digoxin; AM digoxin levels pending  -Continue Entresto   -Continue Furosemide    Hypothyroidism:  -Continue Synthroid     ID:  -Vancomycin/Zosyn for infected LE wound; downgraded to cefazolin 12/25  -continue to f/u sensitivties  - Right foot X-ray for pain     Diet:   -DASH diet  -NPO for procedure    Activity:   -OOBA    DVTPPx:   -Hep gtt  -No SCDs    Dispo: TBD   O/N: ELY, VSS. Continued sustained bradycardia, asymptomatic. AM carvedilol held.     24 hr events: : ELY, VSS. Continued sustained bradycardia, asymptomatic. AM carvedilol held.   12/25: TSH normal. Switched form vanc/zosyn to cefazolin. ECHO done, EF 35%. Cx growing S. Epi. Holding dig per cardiology for bradycardia.    SUBJECTIVE: Patient seen at bedside with chief resident. No acute distress. No CP, SOB, or worsening pain over L shin wound.     Vital Signs Last 24 Hrs  T(C): 36.1 (26 Dec 2021 05:07), Max: 36.8 (25 Dec 2021 22:13)  T(F): 96.9 (26 Dec 2021 05:07), Max: 98.2 (25 Dec 2021 22:13)  HR: 52 (26 Dec 2021 05:29) (50 - 86)  BP: 120/58 (26 Dec 2021 05:29) (88/49 - 120/58)  BP(mean): 64 (25 Dec 2021 17:56) (64 - 81)  RR: 17 (26 Dec 2021 05:29) (17 - 19)  SpO2: 96% (26 Dec 2021 05:29) (94% - 100%)    Physical Exam:  General: No acute distress, resting comfortably in bed  C/V: normal sinus rhythm  Pulm: Nonlabored breathing, no respiratory distress  Abd: soft, non-tender, non-distended.  Extrem: warm and well perfused, no edema. Palpable pedal pulses bilaterally. 10cm circular necrotic shin wound on LLE; no drainage, pus, malodor noted.    Lines/drains/tubes:    I&O's Summary    25 Dec 2021 07:01  -  26 Dec 2021 07:00  --------------------------------------------------------  IN: 640 mL / OUT: 2650 mL / NET: -2010 mL    26 Dec 2021 07:01  -  26 Dec 2021 08:30  --------------------------------------------------------  IN: 0 mL / OUT: 300 mL / NET: -300 mL        LABS:                        10.1   4.99  )-----------( 202      ( 26 Dec 2021 06:11 )             32.3     12-26    142  |  102  |  20  ----------------------------<  109<H>  3.9   |  32<H>  |  0.85    Ca    9.1      26 Dec 2021 06:11  Phos  2.5     12-26  Mg     2.2     12-26      PT/INR - ( 26 Dec 2021 06:11 )   PT: 15.7 sec;   INR: 1.33          PTT - ( 25 Dec 2021 06:54 )  PTT:87.4 sec    CAPILLARY BLOOD GLUCOSE            RADIOLOGY & ADDITIONAL STUDIES:      ---------------------------------------------------------------------------  PLEASE CHECK WHEN PRESENT:  [x ] Heart Failure  [  ] Acute  [  ] Acute on Chronic  [x ] Chronic  -------------------------------------------------------------------  [  ]Diastolic [HFpEF]  [  ]Systolic [HFrEF]  [  ]Combined [HFpEF & HFrEF]  [  ] afib  [x ] hypertensive heart disease  [ x ]Other: Rheumatic fever, prosthetic heart valve   -------------------------------------------------------------------  [ ] Respiratory failure  [ ] Acute cor pulmonale  [ ] Asthma/COPD Exacerbation  [ ] Pleural effusion  [ ] Aspiration pneumonia  -------------------------------------------------------------------  [  ]EMERY  [  ]ATN  [  ]Reneal Medullary Necrosis  [  ]Renal Cortical Necrosis  [  ]Other Pathological Lesions:    [  ]CKD 1  [  ]CKD 2  [  ]CKD 3  [  ]CKD 4  [  ]CKD 5  [  ]Other  -------------------------------------------------------------------  [  ]Diabetes  [  ] Diabetic PVD Ulcer  [  ] Neuropathic ulcer to DM  [  ] Diabetes with Nephropathy  [  ] Osteomyelitis due to diabetes  --------------------------------------------------------------------  [  ]Malnutrition: See Nutrition Note  [  ]Cachexia  [  ]Other:   [  ]Supplement Ordered:  [  ]Morbid Obesity (BMI >=40]  ---------------------------------------------------------------------  [ ] Sepsis/severe sepsis/septic shock  [ ] UTI  [ ] Pneumonia  -----------------------------------------------------------------------  [ ] Acidosis/alkalosis  [ ] Fluid overload  [ ] Hypokalemia  [ ] Hyperkalemia  [ ] Hypomagnesemia  [ ] Hypophosphatemia  [ ] Hyperphosphatemia  ------------------------------------------------------------------------  [ ] Acute blood loss anemia  [ ] Post op blood loss anemia  [ ] Iron deficiency anemia  [ ] Anemia due to chronic disease  [ ] Hypercoagulable state  ----------------------------------------------------------------------  [ ] Cerebral infarction  [ ] Transient ischemia attack  [ ] Encephalopathy      Assessment  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    Vascular/PAD:  -s/p LLE wound debridement (12/24/21 )  -Wound care to L shin daily with xeroform and kerlix  - Pain control prn    MVR:   -Restarted coumadin 12/25; Lovenox to coumadin bridge  -Daily INR    HTN/HLD:  -Continue Carvedilol  -Continue Rosuvastatin (therapeutic interchange)     CAD/CHF:  -Continue Digoxin; AM digoxin level 1.7, holding per cardiology  -Continue Entresto   -Continue Furosemide    Hypothyroidism:  -Continue Synthroid     ID:  -Vancomycin/Zosyn for infected LE wound; downgraded to cefazolin 12/25  -continue to f/u sensitivties  - Right foot X-ray for pain     Diet:   -DASH diet    Activity:   -OOBA    DVTPPx:   -Hep gtt  -No SCDs    Dispo: TBD

## 2021-12-26 NOTE — DISCHARGE NOTE PROVIDER - CARE PROVIDER_API CALL
Palak Snyder)  LX [facility] Surgery  Vascular  [address], [address] [place] [address]  Phone: [phone]  Fax: [phone]  Follow Up Time:     Finn Beck; MPH)  Surgery  [address] [address]  Phone: [phone]  Fax: [phone]  Follow Up Time:    Finn Beck; MPH)  Surgery  [address] [place] [id number]  Phone: [phone]  Fax: [phone]  Follow Up Time: 1-3 days

## 2021-12-27 ENCOUNTER — TRANSCRIPTION ENCOUNTER (OUTPATIENT)
Age: 81
End: 2021-12-27

## 2021-12-27 VITALS
DIASTOLIC BLOOD PRESSURE: 81 MMHG | HEART RATE: 67 BPM | SYSTOLIC BLOOD PRESSURE: 125 MMHG | OXYGEN SATURATION: 98 % | RESPIRATION RATE: 18 BRPM

## 2021-12-27 LAB
-  CEFAZOLIN: SIGNIFICANT CHANGE UP
-  CEFAZOLIN: SIGNIFICANT CHANGE UP
-  CLINDAMYCIN: SIGNIFICANT CHANGE UP
-  CLINDAMYCIN: SIGNIFICANT CHANGE UP
-  ERYTHROMYCIN: SIGNIFICANT CHANGE UP
-  ERYTHROMYCIN: SIGNIFICANT CHANGE UP
-  LINEZOLID: SIGNIFICANT CHANGE UP
-  LINEZOLID: SIGNIFICANT CHANGE UP
-  OXACILLIN: SIGNIFICANT CHANGE UP
-  OXACILLIN: SIGNIFICANT CHANGE UP
-  RIFAMPIN: SIGNIFICANT CHANGE UP
-  RIFAMPIN: SIGNIFICANT CHANGE UP
-  TRIMETHOPRIM/SULFAMETHOXAZOLE: SIGNIFICANT CHANGE UP
-  TRIMETHOPRIM/SULFAMETHOXAZOLE: SIGNIFICANT CHANGE UP
-  VANCOMYCIN: SIGNIFICANT CHANGE UP
-  VANCOMYCIN: SIGNIFICANT CHANGE UP
ANION GAP SERPL CALC-SCNC: 7 MMOL/L — SIGNIFICANT CHANGE UP (ref 5–17)
APTT BLD: 35.5 SEC — SIGNIFICANT CHANGE UP (ref 27.5–35.5)
BUN SERPL-MCNC: 17 MG/DL — SIGNIFICANT CHANGE UP (ref 7–23)
CALCIUM SERPL-MCNC: 9 MG/DL — SIGNIFICANT CHANGE UP (ref 8.4–10.5)
CHLORIDE SERPL-SCNC: 102 MMOL/L — SIGNIFICANT CHANGE UP (ref 96–108)
CO2 SERPL-SCNC: 31 MMOL/L — SIGNIFICANT CHANGE UP (ref 22–31)
CREAT SERPL-MCNC: 0.88 MG/DL — SIGNIFICANT CHANGE UP (ref 0.5–1.3)
CULTURE RESULTS: SIGNIFICANT CHANGE UP
DIGOXIN SERPL-MCNC: 1.2 NG/ML — SIGNIFICANT CHANGE UP (ref 0.8–2)
GLUCOSE SERPL-MCNC: 98 MG/DL — SIGNIFICANT CHANGE UP (ref 70–99)
HCT VFR BLD CALC: 34.9 % — SIGNIFICANT CHANGE UP (ref 34.5–45)
HGB BLD-MCNC: 11 G/DL — LOW (ref 11.5–15.5)
INR BLD: 1.49 — HIGH (ref 0.88–1.16)
MAGNESIUM SERPL-MCNC: 2.4 MG/DL — SIGNIFICANT CHANGE UP (ref 1.6–2.6)
MCHC RBC-ENTMCNC: 31.3 PG — SIGNIFICANT CHANGE UP (ref 27–34)
MCHC RBC-ENTMCNC: 31.5 GM/DL — LOW (ref 32–36)
MCV RBC AUTO: 99.4 FL — SIGNIFICANT CHANGE UP (ref 80–100)
METHOD TYPE: SIGNIFICANT CHANGE UP
METHOD TYPE: SIGNIFICANT CHANGE UP
NRBC # BLD: 0 /100 WBCS — SIGNIFICANT CHANGE UP (ref 0–0)
ORGANISM # SPEC MICROSCOPIC CNT: SIGNIFICANT CHANGE UP
PHOSPHATE SERPL-MCNC: 2.7 MG/DL — SIGNIFICANT CHANGE UP (ref 2.5–4.5)
PLATELET # BLD AUTO: 206 K/UL — SIGNIFICANT CHANGE UP (ref 150–400)
POTASSIUM SERPL-MCNC: 4.2 MMOL/L — SIGNIFICANT CHANGE UP (ref 3.5–5.3)
POTASSIUM SERPL-SCNC: 4.2 MMOL/L — SIGNIFICANT CHANGE UP (ref 3.5–5.3)
PROTHROM AB SERPL-ACNC: 17.5 SEC — HIGH (ref 10.6–13.6)
RBC # BLD: 3.51 M/UL — LOW (ref 3.8–5.2)
RBC # FLD: 16.8 % — HIGH (ref 10.3–14.5)
SODIUM SERPL-SCNC: 140 MMOL/L — SIGNIFICANT CHANGE UP (ref 135–145)
SPECIMEN SOURCE: SIGNIFICANT CHANGE UP
WBC # BLD: 5.35 K/UL — SIGNIFICANT CHANGE UP (ref 3.8–10.5)
WBC # FLD AUTO: 5.35 K/UL — SIGNIFICANT CHANGE UP (ref 3.8–10.5)

## 2021-12-27 PROCEDURE — 93005 ELECTROCARDIOGRAM TRACING: CPT

## 2021-12-27 PROCEDURE — 87186 SC STD MICRODIL/AGAR DIL: CPT

## 2021-12-27 PROCEDURE — 86850 RBC ANTIBODY SCREEN: CPT

## 2021-12-27 PROCEDURE — 73650 X-RAY EXAM OF HEEL: CPT

## 2021-12-27 PROCEDURE — 84100 ASSAY OF PHOSPHORUS: CPT

## 2021-12-27 PROCEDURE — 85610 PROTHROMBIN TIME: CPT

## 2021-12-27 PROCEDURE — 87075 CULTR BACTERIA EXCEPT BLOOD: CPT

## 2021-12-27 PROCEDURE — 71045 X-RAY EXAM CHEST 1 VIEW: CPT

## 2021-12-27 PROCEDURE — 80162 ASSAY OF DIGOXIN TOTAL: CPT

## 2021-12-27 PROCEDURE — 87070 CULTURE OTHR SPECIMN AEROBIC: CPT

## 2021-12-27 PROCEDURE — 99233 SBSQ HOSP IP/OBS HIGH 50: CPT

## 2021-12-27 PROCEDURE — 85027 COMPLETE CBC AUTOMATED: CPT

## 2021-12-27 PROCEDURE — 85730 THROMBOPLASTIN TIME PARTIAL: CPT

## 2021-12-27 PROCEDURE — 97161 PT EVAL LOW COMPLEX 20 MIN: CPT

## 2021-12-27 PROCEDURE — 99285 EMERGENCY DEPT VISIT HI MDM: CPT

## 2021-12-27 PROCEDURE — 87635 SARS-COV-2 COVID-19 AMP PRB: CPT

## 2021-12-27 PROCEDURE — 87040 BLOOD CULTURE FOR BACTERIA: CPT

## 2021-12-27 PROCEDURE — 80053 COMPREHEN METABOLIC PANEL: CPT

## 2021-12-27 PROCEDURE — 83735 ASSAY OF MAGNESIUM: CPT

## 2021-12-27 PROCEDURE — 86900 BLOOD TYPING SEROLOGIC ABO: CPT

## 2021-12-27 PROCEDURE — 84443 ASSAY THYROID STIM HORMONE: CPT

## 2021-12-27 PROCEDURE — 86901 BLOOD TYPING SEROLOGIC RH(D): CPT

## 2021-12-27 PROCEDURE — 36415 COLL VENOUS BLD VENIPUNCTURE: CPT

## 2021-12-27 PROCEDURE — 80048 BASIC METABOLIC PNL TOTAL CA: CPT

## 2021-12-27 PROCEDURE — 93306 TTE W/DOPPLER COMPLETE: CPT

## 2021-12-27 PROCEDURE — 85025 COMPLETE CBC W/AUTO DIFF WBC: CPT

## 2021-12-27 RX ORDER — ENOXAPARIN SODIUM 100 MG/ML
60 INJECTION SUBCUTANEOUS
Qty: 14 | Refills: 0
Start: 2021-12-27 | End: 2022-01-02

## 2021-12-27 RX ORDER — WARFARIN SODIUM 2.5 MG/1
7.5 TABLET ORAL ONCE
Refills: 0 | Status: DISCONTINUED | OUTPATIENT
Start: 2021-12-27 | End: 2021-12-27

## 2021-12-27 RX ORDER — WARFARIN SODIUM 2.5 MG/1
1 TABLET ORAL
Qty: 0 | Refills: 0 | DISCHARGE

## 2021-12-27 RX ORDER — ACETAMINOPHEN 500 MG
2 TABLET ORAL
Qty: 0 | Refills: 0 | DISCHARGE
Start: 2021-12-27

## 2021-12-27 RX ORDER — ENOXAPARIN SODIUM 100 MG/ML
60 INJECTION SUBCUTANEOUS
Qty: 840 | Refills: 0
Start: 2021-12-27 | End: 2022-01-02

## 2021-12-27 RX ORDER — LINEZOLID 600 MG/300ML
1 INJECTION, SOLUTION INTRAVENOUS
Qty: 20 | Refills: 0
Start: 2021-12-27 | End: 2022-01-05

## 2021-12-27 RX ADMIN — Medication 20 MILLIGRAM(S): at 05:44

## 2021-12-27 RX ADMIN — Medication 100 MICROGRAM(S): at 05:44

## 2021-12-27 RX ADMIN — Medication 100 MILLIGRAM(S): at 05:44

## 2021-12-27 RX ADMIN — Medication 650 MILLIGRAM(S): at 12:26

## 2021-12-27 RX ADMIN — SACUBITRIL AND VALSARTAN 1 TABLET(S): 24; 26 TABLET, FILM COATED ORAL at 05:55

## 2021-12-27 RX ADMIN — CARVEDILOL PHOSPHATE 3.12 MILLIGRAM(S): 80 CAPSULE, EXTENDED RELEASE ORAL at 05:44

## 2021-12-27 NOTE — PROGRESS NOTE ADULT - SUBJECTIVE AND OBJECTIVE BOX
INCOMPLETE    INTERVAL EVENTS:    PAST MEDICAL & SURGICAL HISTORY:  Rheumatic fever  Hypertension  Hyperlipidemia  Hypothyroidism  Congestive heart failure  Chronic atrial fibrillation  Recurrent pleural effusion on right  Staphylococcus epidermidis infection  Anemia due to acute blood loss  S/P MVR (mitral valve replacement)  Cardiomegaly  History of mitral valve replacement    Home Medications:  carvedilol 3.125 mg oral tablet: 1 tab(s) orally 2 times a day (23 Dec 2021 17:21)  Coumadin 5 mg oral tablet: 1 tab(s) orally once a day   6 days/week (23 Dec 2021 17:21)  digoxin 125 mcg (0.125 mg) oral tablet: 1 tab(s) orally 2 times a day (23 Dec 2021 17:21)  Entresto 24 mg-26 mg oral tablet: 1 tab(s) orally 2 times a day (23 Dec 2021 17:21)  furosemide 20 mg oral tablet: 1 tab(s) orally once a day (23 Dec 2021 17:21)  furosemide 40 mg oral tablet: 1 tab(s) orally once a day (23 Dec 2021 17:21)  rosuvastatin 5 mg oral tablet: 1 tab(s) orally once a day (23 Dec 2021 17:21)  Synthroid 75 mcg (0.075 mg) oral tablet: 1 tab(s) orally once a day alternate every other day with 100mcg  (23 Dec 2021 17:40)  tramadol-acetaminophen 37.5 mg-325 mg oral tablet: 1 tab(s) orally 2 times a day (23 Dec 2021 17:21)    MEDICATIONS  (STANDING):  atorvastatin 20 milliGRAM(s) Oral at bedtime  carvedilol 3.125 milliGRAM(s) Oral every 12 hours  ceFAZolin   IVPB      ceFAZolin   IVPB 1000 milliGRAM(s) IV Intermittent every 8 hours  enoxaparin Injectable 60 milliGRAM(s) SubCutaneous every 12 hours  furosemide    Tablet 20 milliGRAM(s) Oral daily  levothyroxine 75 MICROGram(s) Oral <User Schedule>  levothyroxine 100 MICROGram(s) Oral <User Schedule>  sacubitril 24 mG/valsartan 26 mG 1 Tablet(s) Oral two times a day    MEDICATIONS  (PRN):  acetaminophen     Tablet .. 650 milliGRAM(s) Oral every 6 hours PRN Mild Pain (1 - 3)  metoclopramide Injectable 10 milliGRAM(s) IV Push once PRN nausea/vomiting    Vital Signs Last 24 Hrs  T(C): 36.3 (27 Dec 2021 04:36), Max: 36.3 (27 Dec 2021 04:36)  T(F): 97.3 (27 Dec 2021 04:36), Max: 97.3 (27 Dec 2021 04:36)  HR: 72 (27 Dec 2021 05:42) (47 - 82)  BP: 134/65 (27 Dec 2021 05:42) (79/44 - 134/65)  BP(mean): --  RR: 16 (27 Dec 2021 05:42) (16 - 18)  SpO2: 97% (27 Dec 2021 05:42) (97% - 98%)    PHYSICAL EXAM:  GEN: Awake, alert. NAD.   HEENT: NCAT, PERRL, EOMI. Mucosa moist. No JVD.  RESP: CTA b/l  CV: RRR. Normal S1/S2. No m/r/g.  ABD: Soft. NT/ND. BS+  EXT: Warm. Trace edema bilaterally   NEURO: AAOx3. No focal deficits.     LABS:                        11.0   5.35  )-----------( 206      ( 27 Dec 2021 07:00 )             34.9     12-26    142  |  102  |  20  ----------------------------<  109<H>  3.9   |  32<H>  |  0.85    Ca    9.1      26 Dec 2021 06:11  Phos  2.5     12-26  Mg     2.2     12-26    PT/INR - ( 27 Dec 2021 07:00 )   PT: 17.5 sec;   INR: 1.49        PTT - ( 27 Dec 2021 07:00 )  PTT:35.5 sec    I&O's Summary    26 Dec 2021 07:01  -  27 Dec 2021 07:00  --------------------------------------------------------  IN: 950 mL / OUT: 1450 mL / NET: -500 mL    < from: TTE Echo Complete w/o Contrast w/ Doppler (12.25.21 @ 08:02) >  1. Dilated left ventriclular size.   2. Mild symmetric left ventricular hypertrophy.   3. Moderately reduced left ventricular systolic function.   4. Normal right ventricular size and systolic function.   5. Biatrial enlargement.   6. A mechanical valve is noted in the mitral position. The mean   transvalvular gradient is 2.35 mmHg at a heart rate of 58 bpm. There is   trace mitral regurgitation.   7. There is moderate-to-severe aortic stenosis. The peak transvalvular   velocity is 2.83 m/s, the mean transvalvular gradient is 15.60 mmHg, and   the LVOT/AV velocity ratio is 0.23. The peak transaortic gradient is   32.04 mmHg. The calculated left ventricular stroke volume index is 30.00   ml/m² (normal >35 ml/m2). There is trace aortic regurgitation.   8. Moderate tricuspid regurgitation.   9. Pulmonary hypertension present, pulmonary artery systolic pressure is   48 mmHg.  10. No pericardial effusion.  11. No prior echo is available for comparison.  < end of copied text >   INTERVAL EVENTS:  Patient seen and examined at bedside; found resting comfortably in bed. No acute events reported overnight. Patient denies CP, SOB, palpitations and dizziness. She is requesting that she be discharged today in order to tend to her dog at home.    PAST MEDICAL & SURGICAL HISTORY:  Rheumatic fever  Hypertension  Hyperlipidemia  Hypothyroidism  Congestive heart failure  Chronic atrial fibrillation  Recurrent pleural effusion on right  Staphylococcus epidermidis infection  Anemia due to acute blood loss  S/P MVR (mitral valve replacement)  Cardiomegaly  History of mitral valve replacement    Home Medications:  carvedilol 3.125 mg oral tablet: 1 tab(s) orally 2 times a day (23 Dec 2021 17:21)  Coumadin 5 mg oral tablet: 1 tab(s) orally once a day   6 days/week (23 Dec 2021 17:21)  digoxin 125 mcg (0.125 mg) oral tablet: 1 tab(s) orally 2 times a day (23 Dec 2021 17:21)  Entresto 24 mg-26 mg oral tablet: 1 tab(s) orally 2 times a day (23 Dec 2021 17:21)  furosemide 20 mg oral tablet: 1 tab(s) orally once a day (23 Dec 2021 17:21)  furosemide 40 mg oral tablet: 1 tab(s) orally once a day (23 Dec 2021 17:21)  rosuvastatin 5 mg oral tablet: 1 tab(s) orally once a day (23 Dec 2021 17:21)  Synthroid 75 mcg (0.075 mg) oral tablet: 1 tab(s) orally once a day alternate every other day with 100mcg  (23 Dec 2021 17:40)  tramadol-acetaminophen 37.5 mg-325 mg oral tablet: 1 tab(s) orally 2 times a day (23 Dec 2021 17:21)    MEDICATIONS  (STANDING):  atorvastatin 20 milliGRAM(s) Oral at bedtime  carvedilol 3.125 milliGRAM(s) Oral every 12 hours  ceFAZolin   IVPB      ceFAZolin   IVPB 1000 milliGRAM(s) IV Intermittent every 8 hours  enoxaparin Injectable 60 milliGRAM(s) SubCutaneous every 12 hours  furosemide    Tablet 20 milliGRAM(s) Oral daily  levothyroxine 75 MICROGram(s) Oral <User Schedule>  levothyroxine 100 MICROGram(s) Oral <User Schedule>  sacubitril 24 mG/valsartan 26 mG 1 Tablet(s) Oral two times a day    MEDICATIONS  (PRN):  acetaminophen     Tablet .. 650 milliGRAM(s) Oral every 6 hours PRN Mild Pain (1 - 3)  metoclopramide Injectable 10 milliGRAM(s) IV Push once PRN nausea/vomiting    Vital Signs Last 24 Hrs  T(C): 36.3 (27 Dec 2021 04:36), Max: 36.3 (27 Dec 2021 04:36)  T(F): 97.3 (27 Dec 2021 04:36), Max: 97.3 (27 Dec 2021 04:36)  HR: 72 (27 Dec 2021 05:42) (47 - 82)  BP: 134/65 (27 Dec 2021 05:42) (79/44 - 134/65)  BP(mean): --  RR: 16 (27 Dec 2021 05:42) (16 - 18)  SpO2: 97% (27 Dec 2021 05:42) (97% - 98%)    PHYSICAL EXAM:  GEN: Awake, alert. NAD.   HEENT: No JVD.  RESP: CTA b/l, no w/r/r.  CV: RRR. Normal S1/S2. No m/r/g.  ABD: Soft. NT/ND. BS+  EXT: Warm. LLE wound dressing in place.   NEURO: AAOx3. No focal deficits.     LABS:                        11.0   5.35  )-----------( 206      ( 27 Dec 2021 07:00 )             34.9     12-26    142  |  102  |  20  ----------------------------<  109<H>  3.9   |  32<H>  |  0.85    Ca    9.1      26 Dec 2021 06:11  Phos  2.5     12-26  Mg     2.2     12-26    PT/INR - ( 27 Dec 2021 07:00 )   PT: 17.5 sec;   INR: 1.49        PTT - ( 27 Dec 2021 07:00 )  PTT:35.5 sec    I&O's Summary    26 Dec 2021 07:01  -  27 Dec 2021 07:00  --------------------------------------------------------  IN: 950 mL / OUT: 1450 mL / NET: -500 mL    < from: TTE Echo Complete w/o Contrast w/ Doppler (12.25.21 @ 08:02) >  1. Dilated left ventriclular size.   2. Mild symmetric left ventricular hypertrophy.   3. Moderately reduced left ventricular systolic function.   4. Normal right ventricular size and systolic function.   5. Biatrial enlargement.   6. A mechanical valve is noted in the mitral position. The mean   transvalvular gradient is 2.35 mmHg at a heart rate of 58 bpm. There is   trace mitral regurgitation.   7. There is moderate-to-severe aortic stenosis. The peak transvalvular   velocity is 2.83 m/s, the mean transvalvular gradient is 15.60 mmHg, and   the LVOT/AV velocity ratio is 0.23. The peak transaortic gradient is   32.04 mmHg. The calculated left ventricular stroke volume index is 30.00   ml/m² (normal >35 ml/m2). There is trace aortic regurgitation.   8. Moderate tricuspid regurgitation.   9. Pulmonary hypertension present, pulmonary artery systolic pressure is   48 mmHg.  10. No pericardial effusion.  11. No prior echo is available for comparison.  < end of copied text >

## 2021-12-27 NOTE — PROGRESS NOTE ADULT - ASSESSMENT
81 year old woman, w/ a PMHx of HFrEF (35% 12/25/21), rheumatic fever s/p MVR (2000 on coumadin), pAFib/flutter, HTN, HLD, and hypothyroidism, who p/w worsening L shin wound. Cardiology consulted for atrial flutter s/p LLE wound debridement.    Atrial flutter w/ variable block  Elderly patient with known atrial flutter. She is currently rate controlled and asymptomatic. Dig level 1.7 ng/mL yesterday AM.  - Given bradycardia, please hold digoxin for now  - continue home Coreg 3.125 mg PO BID  - obtain daily EKG  - continue telemetry monitoring    HFrEF  EF 30-35% on TTE 12/25/2021. Euvolemic on exam.  - continue home furosemide 20mg IV PO QDay       -> confirm home regimen as both 20mg and 40mg listed in medication reconciliation   - continue home Coreg 3.125 mg PO BID  - continue home Entresto 24/26 mg PO BID  - obtain daily weights  - monitor accurate I/O  - trend electrolytes and replete PRN (K>4, Mg>2) given diuresis    Rheumatic fever s/p MVR   Mechanical MV as per TTE 12/25/2021.  - Patient on home dose coumadin 5mg PO QD, bridged with enoxaparin 60mg SubQ BID  - monitor INR (goal 2.5-3.5)    AS  Moderate to severe AS as per TTE 12/25/2021.  - closely monitor vital signs as patient is preload dependent  - will need close f/u w/ Cardiology s/p discharge    Attending attestation to follow. 81 year old woman, w/ a PMHx of HFrEF (35% 12/25/21), rheumatic fever s/p MVR (2000 on coumadin), pAFib/flutter, HTN, HLD, and hypothyroidism, who p/w worsening L shin wound. Cardiology consulted for atrial flutter s/p LLE wound debridement.    Atrial flutter w/ variable block  Elderly patient with known atrial flutter. She is currently rate controlled and asymptomatic. Dig level 1.7 ng/mL yesterday AM.  - patient to continue home digoxin and will have this f/u w/ o/p Cardiologist (Dr. Lara)  - continue home Coreg 3.125 mg PO BID through discharge  - obtain daily EKG  - continue telemetry monitoring    HFrEF  EF 30-35% on TTE 12/25/2021. Euvolemic on exam.  - continue home furosemide 20mg PO QDay through discharge       -> confirm home regimen as both 20mg and 40mg listed in medication reconciliation   - continue home Coreg 3.125 mg PO BID through discharge  - continue home Entresto 24/26 mg PO BID through discharge  - obtain daily weights  - monitor accurate I/O  - trend electrolytes and replete PRN (K>4, Mg>2) given diuresis    Rheumatic fever s/p MVR   Mechanical MV as per TTE 12/25/2021. Patient requesting to be discharged prior to therapeutic INR achievement  - patient on home dose coumadin 5mg PO QD, bridged with enoxaparin 60mg SubQ BID  - plan for patient to administer enoxaparin at home w/ visiting nurse, and will f/u INRs closely w/ o/p Cardiologist (Dr. Lara)  - monitor INR (goal 2.5-3.5)    AS  Moderate to severe AS as per TTE 12/25/2021.  - closely monitor vital signs as patient is preload dependent  - will need close f/u w/ Cardiology s/p discharge    Attending attestation to follow.

## 2021-12-27 NOTE — PROGRESS NOTE ADULT - SUBJECTIVE AND OBJECTIVE BOX
CCM PUD IM    INTERVAL HPI/OVERNIGHT EVENTS:    now on enoxaparin  pain has improved  she is very concerned about help at home   wants VNS    All new data reviewed, including VS, lab, imaging, documentation and Rx.    Cephalosporin discontinued as Staph. epidermidis    PAST MEDICAL & SURGICAL HISTORY:  Rheumatic fever    Hypertension    Hyperlipidemia    Hypothyroidism    Congestive heart failure    History of mitral valve replacement    FAMILY HISTORY:  fa 48 AMI  mother 50s AMI    SOCIAL HISTORY:  no alcohol  smoking in the 30s    REVIEW OF SYSTEMS:  Constitutional: (-) weight change, () fever,  () chills, () fatigue, () night sweats  Eyes: (-) discharge, () eye pain, () visual change  ENT:  (-) hearing difficulty, () vertigo, () sinus pain,  () throat pain, () epistaxis, () dysphagia, () hoarseness  Neck: (-) pain, () stiffness, () swelling  Respiratory: (-) cough, () wheezing, () hemoptysis      Cardiovascular: (-) chest pain, ()palpitations, () dizziness   Gastrointestinal: (-) abdominal pain, () nausea, () vomiting, () hematemesis, () diarrhea,  (-) constipation, () melena  Genitourinary:  (-) dysuria, () frequency, () hematuria, () incontinence      Neurologic: (-) headache, () memory loss, () loss of strength, () numbness, () tremor     Skin: (-) itching, () burning, () rash, () lesions   Lymphatic: (-) enlarged lymph nodes  Endocrine: (-) hair loss, () temperature intolerance         Musculoskeletal: (-) back pain, () joint pain,  () extremity pain  Psychiatric: () visual change, (-) auditory change, () depression, () anxiety, () suicidal  Sleep: (-) disorder, () insomnia, () sleep deprivation  Heme/Lymph: (-) easy bruising, () bleeding gums            Allergy and Immunologic: () hives, () eczema    Vital Signs Last 24 Hrs  T(C): 35.8 (25 Dec 2021 13:19), Max: 37.1 (24 Dec 2021 22:09)  T(F): 96.4 (25 Dec 2021 13:19), Max: 98.8 (25 Dec 2021 06:27)  HR: 50 (25 Dec 2021 13:11) (50 - 78)  BP: 104/51 (25 Dec 2021 13:11) (88/49 - 113/56)  BP(mean): 74 (25 Dec 2021 13:11) (64 - 81)  RR: 19 (25 Dec 2021 13:11) (16 - 19)  SpO2: 100% (25 Dec 2021 13:11) (94% - 100%)    I&O's Detail    24 Dec 2021 07:01  -  25 Dec 2021 07:00  --------------------------------------------------------  IN:    Oral Fluid: 180 mL    Other (mL): 875 mL  Total IN: 1055 mL    OUT:    Voided (mL): 800 mL  Total OUT: 800 mL    Total NET: 255 mL      25 Dec 2021 07:01  -  25 Dec 2021 15:22  --------------------------------------------------------  IN:    Oral Fluid: 360 mL  Total IN: 360 mL    OUT:    Voided (mL): 500 mL  Total OUT: 500 mL    Total NET: -140 mL    PHYSICAL EXAM:    Well nourished, well developed, comfortable with pain medication, - acute distress; vital signs are monitored  Eyes: PERRLA, EOMI, -conjunctivitis, -scleritis   Head: no focal deficit, normocephalic,  no trauma  ENMT: moist tongue, no thrush, -nasal discharge, -hoarseness, normal hearing, -cough, -hemoptysis, trachea midline  Neck: supple, - lymphadenopathy,  -masses, -JVD  Respiratory: bilateral diminished breath sounds, -wheezing, -rhonchi, -rales, -crackles  Chest: -accessory muscle use, -paradoxical breathing  Cardiovascular: irregular rate and afib, S1 S2 normal, -S3, -S4, -murmur, -gallop, -rub  Gastrointestinal: soft, nontender, nondistended, normal bowel sounds, no hepatosplenomegaly  Genitourinary: -flank pain, -dysuria  Extremities: -clubbing, -cyanosis, -edema    Vascular: peripheral pulses palpable 2+ bilaterally  Neurological: alert, oriented x 3, no focal deficit, -tremor   Skin: warm, dry, -erythema, iv sites intact  Lymph nodes; no cervical, supraclavicular or axillary adenopathy  Psychiatric: cooperative, appropriate mood      MEDICATIONS  (STANDING):  atorvastatin 20 milliGRAM(s) Oral at bedtime  carvedilol 3.125 milliGRAM(s) Oral every 12 hours  ceFAZolin   IVPB      ceFAZolin   IVPB 1000 milliGRAM(s) IV Intermittent every 8 hours  digoxin     Tablet 125 MICROGram(s) Oral every 12 hours  enoxaparin Injectable 60 milliGRAM(s) SubCutaneous every 12 hours  furosemide   Injectable 20 milliGRAM(s) IV Push every 12 hours  levothyroxine 75 MICROGram(s) Oral <User Schedule>  levothyroxine 100 MICROGram(s) Oral <User Schedule>  sacubitril 24 mG/valsartan 26 mG 1 Tablet(s) Oral two times a day  warfarin 5 milliGRAM(s) Oral once    MEDICATIONS  (PRN):  metoclopramide Injectable 10 milliGRAM(s) IV Push once PRN nausea/vomiting  oxycodone    5 mG/acetaminophen 325 mG 1 Tablet(s) Oral every 6 hours PRN Moderate Pain (4 - 6)      Allergies    No Known Allergies    Intolerances    LABS:                        9.4    6.93  )-----------( 193      ( 25 Dec 2021 06:54 ) HB at 10.1             29.5     12-25    135  |  97  |  15  ----------------------------<  124<H>  3.6   |  29  |  0.99    Ca    8.5      25 Dec 2021 06:54  Phos  3.3     12-25  Mg     1.9     12-25    TPro  6.6  /  Alb  4.2  /  TBili  0.4  /  DBili  x   /  AST  43<H>  /  ALT  20  /  AlkPhos  45  12-23    PT/INR - ( 25 Dec 2021 06:54 )   PT: 18.0 sec;   INR: 1.53   increased to 1.7  PTT - ( 25 Dec 2021 06:54 )  PTT:87.4 sec    +DVT prophylaxis OC  +Sleep    +Nutrition goals  ORAL  -Pain  CONTINUE TYLENOL, OXYCODON discontinued  -Decubital ulcer  +GI prophylaxis (PPV, coagulopathy, Hx)  +Aspiration prophylaxis (45 degrees)  +Sedation/analgesia stopped once  +ID (phos, CH, mupi, SB)  -Delirium  +Cardiac Beta/ACEI-ARB/ASA  OFF /statin  +Prevention  +Education  +Medication reviewed (drug-drug interactions, PDA)  Medical devices  Discussed with PGY, RN    RADIOLOGY & ADDITIONAL STUDIES:    CXR with cardiomegaly    XR calcaneus without osteomyelitis    Assessment and Plan:    Problem/Plan - 1:  ·  Problem: atrial fibrillation.        Problem/Plan - 2:  ·  Problem: Hypothyroidism.   ·  Plan: TSH normal.     Problem/Plan - 3:  ·  Problem: Congestive heart failure.   ·  Plan: on recommended medications.     Problem/Plan - 4:  ·  Problem: Recurrent pleural effusion on right.   ·  Plan: improved.     Problem/Plan - 5:  ·  Problem: Staphylococcus epidermidis infection.   ·  Plan: not susceptible to cephalosporin  ONLY TO LINEZOLID  Currently it can be assumed that this is not osteomyelitis  Continue local treatment     She will see Dr Oakley on Thursday  f/u with Dr Alberts also     Problem/Plan - 6:  ·  Problem: Hypertension.      Problem/Plan - 7:  ·  Problem: Rheumatic fever.   ·  Plan: leading to MVR.     Problem/Plan - 8:  ·  Problem: S/P MVR (mitral valve replacement).   ·  Plan: 2000.     Problem/Plan - 9:  ·  Problem: Anemia due to acute blood loss.      Problem/Plan - 10:  ·  Problem: Chronic atrial fibrillation.   ·  Plan; on warfarin 5 mg/day as previously. INR will increase.  Dr Alberts will follow.  Enoxaparin could be changed to 1.5 mg/kg     Problem/Plan - 11:  ·  Problem: Cardiomegaly.   ·  Plan: cardiomegaly on CXR.  She is on appropriate medications including sacubitril    Also discussed with Dr Alberts   CCM PUD IM    INTERVAL HPI/OVERNIGHT EVENTS:    now on enoxaparin  pain has improved  she is very concerned about help at home   wants VNS    All new data reviewed, including VS, lab, imaging, documentation and Rx.    Cephalosporin discontinued as Staph. epidermidis    PAST MEDICAL & SURGICAL HISTORY:  Rheumatic fever    Hypertension    Hyperlipidemia    Hypothyroidism    Congestive heart failure    History of mitral valve replacement    FAMILY HISTORY:  fa 48 AMI  mother 50s AMI    SOCIAL HISTORY:  no alcohol  smoking in the 30s    REVIEW OF SYSTEMS:  Constitutional: (-) weight change, () fever,  () chills, () fatigue, () night sweats  Eyes: (-) discharge, () eye pain, () visual change  ENT:  (-) hearing difficulty, () vertigo, () sinus pain,  () throat pain, () epistaxis, () dysphagia, () hoarseness  Neck: (-) pain, () stiffness, () swelling  Respiratory: (-) cough, () wheezing, () hemoptysis      Cardiovascular: (-) chest pain, ()palpitations, () dizziness   Gastrointestinal: (-) abdominal pain, () nausea, () vomiting, () hematemesis, () diarrhea,  (-) constipation, () melena  Genitourinary:  (-) dysuria, () frequency, () hematuria, () incontinence      Neurologic: (-) headache, () memory loss, () loss of strength, () numbness, () tremor     Skin: (-) itching, () burning, () rash, () lesions   Lymphatic: (-) enlarged lymph nodes  Endocrine: (-) hair loss, () temperature intolerance         Musculoskeletal: (-) back pain, () joint pain,  () extremity pain  Psychiatric: () visual change, (-) auditory change, () depression, () anxiety, () suicidal  Sleep: (-) disorder, () insomnia, () sleep deprivation  Heme/Lymph: (-) easy bruising, () bleeding gums            Allergy and Immunologic: () hives, () eczema    Vital Signs Last 24 Hrs  T(C): 35.8 (25 Dec 2021 13:19), Max: 37.1 (24 Dec 2021 22:09)  T(F): 96.4 (25 Dec 2021 13:19), Max: 98.8 (25 Dec 2021 06:27)  HR: 50 (25 Dec 2021 13:11) (50 - 78)  BP: 104/51 (25 Dec 2021 13:11) (88/49 - 113/56)  BP(mean): 74 (25 Dec 2021 13:11) (64 - 81)  RR: 19 (25 Dec 2021 13:11) (16 - 19)  SpO2: 100% (25 Dec 2021 13:11) (94% - 100%)    I&O's Detail    24 Dec 2021 07:01  -  25 Dec 2021 07:00  --------------------------------------------------------  IN:    Oral Fluid: 180 mL    Other (mL): 875 mL  Total IN: 1055 mL    OUT:    Voided (mL): 800 mL  Total OUT: 800 mL    Total NET: 255 mL      25 Dec 2021 07:01  -  25 Dec 2021 15:22  --------------------------------------------------------  IN:    Oral Fluid: 360 mL  Total IN: 360 mL    OUT:    Voided (mL): 500 mL  Total OUT: 500 mL    Total NET: -140 mL    PHYSICAL EXAM:    Well nourished, well developed, comfortable with pain medication, - acute distress; vital signs are monitored  Eyes: PERRLA, EOMI, -conjunctivitis, -scleritis   Head: no focal deficit, normocephalic,  no trauma  ENMT: moist tongue, no thrush, -nasal discharge, -hoarseness, normal hearing, -cough, -hemoptysis, trachea midline  Neck: supple, - lymphadenopathy,  -masses, -JVD  Respiratory: bilateral diminished breath sounds, -wheezing, -rhonchi, -rales, -crackles  Chest: -accessory muscle use, -paradoxical breathing  Cardiovascular: irregular rate and afib, S1 S2 normal, -S3, -S4, -murmur, -gallop, -rub  Gastrointestinal: soft, nontender, nondistended, normal bowel sounds, no hepatosplenomegaly  Genitourinary: -flank pain, -dysuria  Extremities: -clubbing, -cyanosis, -edema    Vascular: peripheral pulses palpable 2+ bilaterally  Neurological: alert, oriented x 3, no focal deficit, -tremor   Skin: warm, dry, -erythema, iv sites intact  Lymph nodes; no cervical, supraclavicular or axillary adenopathy  Psychiatric: cooperative, appropriate mood      MEDICATIONS  (STANDING):  atorvastatin 20 milliGRAM(s) Oral at bedtime  carvedilol 3.125 milliGRAM(s) Oral every 12 hours  ceFAZolin   IVPB      ceFAZolin   IVPB 1000 milliGRAM(s) IV Intermittent every 8 hours  digoxin     Tablet 125 MICROGram(s) Oral every 12 hours  enoxaparin Injectable 60 milliGRAM(s) SubCutaneous every 12 hours  furosemide   Injectable 20 milliGRAM(s) IV Push every 12 hours  levothyroxine 75 MICROGram(s) Oral <User Schedule>  levothyroxine 100 MICROGram(s) Oral <User Schedule>  sacubitril 24 mG/valsartan 26 mG 1 Tablet(s) Oral two times a day  warfarin 5 milliGRAM(s) Oral once    MEDICATIONS  (PRN):  metoclopramide Injectable 10 milliGRAM(s) IV Push once PRN nausea/vomiting  oxycodone    5 mG/acetaminophen 325 mG 1 Tablet(s) Oral every 6 hours PRN Moderate Pain (4 - 6)      Allergies    No Known Allergies    Intolerances    LABS:                        9.4    6.93  )-----------( 193      ( 25 Dec 2021 06:54 ) HB at 10.1             29.5     12-25    135  |  97  |  15  ----------------------------<  124<H>  3.6   |  29  |  0.99    Ca    8.5      25 Dec 2021 06:54  Phos  3.3     12-25  Mg     1.9     12-25    TPro  6.6  /  Alb  4.2  /  TBili  0.4  /  DBili  x   /  AST  43<H>  /  ALT  20  /  AlkPhos  45  12-23    PT/INR - ( 25 Dec 2021 06:54 )   PT: 18.0 sec;   INR: 1.53   PTT - ( 25 Dec 2021 06:54 )  PTT:87.4 sec    +DVT prophylaxis OC  +Sleep    +Nutrition goals  ORAL  -Pain  CONTINUE TYLENOL, OXYCODON discontinued  -Decubital ulcer  +GI prophylaxis (PPV, coagulopathy, Hx)  +Aspiration prophylaxis (45 degrees)  +Sedation/analgesia stopped once  +ID (phos, CH, mupi, SB)  -Delirium  +Cardiac Beta/ACEI-ARB/ASA  OFF /statin  +Prevention  +Education  +Medication reviewed (drug-drug interactions, PDA)  Medical devices  Discussed with PGY, RN    RADIOLOGY & ADDITIONAL STUDIES:    CXR with cardiomegaly    XR calcaneus without osteomyelitis    Assessment and Plan:    Problem/Plan - 1:  ·  Problem: atrial fibrillation.        Problem/Plan - 2:  ·  Problem: Hypothyroidism.   ·  Plan: TSH normal.     Problem/Plan - 3:  ·  Problem: Congestive heart failure.   ·  Plan: on recommended medications.     Problem/Plan - 4:  ·  Problem: Recurrent pleural effusion on right.   ·  Plan: improved.     Problem/Plan - 5:  ·  Problem: Staphylococcus epidermidis infection.   ·  Plan: not susceptible to cephalosporin  ONLY TO LINEZOLID  Currently it can be assumed that this is not osteomyelitis  Continue local treatment     She will see Dr Oakley on Thursday  f/u with Dr Alberts also     Problem/Plan - 6:  ·  Problem: Hypertension.      Problem/Plan - 7:  ·  Problem: Rheumatic fever.   ·  Plan: leading to MVR.     Problem/Plan - 8:  ·  Problem: S/P MVR (mitral valve replacement).   ·  Plan: 2000.     Problem/Plan - 9:  ·  Problem: Anemia due to acute blood loss.      Problem/Plan - 10:  ·  Problem: Chronic atrial fibrillation.   ·  Plan; on warfarin 5 mg/day as previously. INR will increase.  Dr Alberts will follow.  Enoxaparin could be changed to 1.5 mg/kg     Problem/Plan - 11:  ·  Problem: Cardiomegaly.   ·  Plan: cardiomegaly on CXR.  She is on appropriate medications including sacubitril    Also discussed with Dr Alberts  and the patient

## 2021-12-27 NOTE — DISCHARGE NOTE NURSING/CASE MANAGEMENT/SOCIAL WORK - PATIENT PORTAL LINK FT
You can access the FollowMyHealth Patient Portal offered by [facility] by registering at the following website: [email or web address]. By joining [facility]’s FollowMyHealth portal, you will also be able to view your health information using other applications (apps) compatible with our system.

## 2021-12-27 NOTE — PROGRESS NOTE ADULT - REASON FOR ADMISSION
L shin wound

## 2021-12-27 NOTE — DISCHARGE NOTE NURSING/CASE MANAGEMENT/SOCIAL WORK - NSDCVIVACCINE_GEN_ALL_CORE_FT
Tdap; 03-Jan-2018 14:43; Shellie Summers (DARI); [employer]; [id number]; IntraMuscular; Deltoid Left.; 0.5 milliLiter(s); VIS (VIS Published: 09-May-2013, VIS Presented: 03-Jan-2018);

## 2021-12-27 NOTE — PROGRESS NOTE ADULT - ATTENDING COMMENTS
Initial attending contact date   12/27/21   . See fellow note written above for details. I reviewed the fellow documentation. I have personally seen and examined this patient. I reviewed vitals, labs, medications, cardiac studies, and additional imaging. I agree with the above fellow's findings and plans as written above with the following additions/statements.    -Without active cardiac complaints  -Known parox atrial flutter  -Meds as noted above  -Pt to fu with her outpatient cardiologist

## 2021-12-27 NOTE — DISCHARGE NOTE NURSING/CASE MANAGEMENT/SOCIAL WORK - NSDCPEFALRISK_GEN_ALL_CORE
For information on Fall & Injury Prevention, visit: [email or web address] OR  [email or web address] OR  https://www.cdc.gov/steadi/patient.html

## 2021-12-27 NOTE — PROGRESS NOTE ADULT - PROVIDER SPECIALTY LIST ADULT
Critical Care
Vascular Surgery
Cardiology
Critical Care
Critical Care
Vascular Surgery
Cardiology
Critical Care

## 2021-12-27 NOTE — PROGRESS NOTE ADULT - SUBJECTIVE AND OBJECTIVE BOX
O/N: one episode of BP 85/41 after entresto, back up to 110's on recheck  12/26: AM digoxin level 1.7-> hold dig today per cards, restarted home PO lasix            ---------------------------------------------------------------------------  PLEASE CHECK WHEN PRESENT:  [x ] Heart Failure  [  ] Acute  [  ] Acute on Chronic  [x ] Chronic  -------------------------------------------------------------------  [  ]Diastolic [HFpEF]  [  ]Systolic [HFrEF]  [  ]Combined [HFpEF & HFrEF]  [  ] afib  [x ] hypertensive heart disease  [ x ]Other: Rheumatic fever, prosthetic heart valve   -------------------------------------------------------------------  [ ] Respiratory failure  [ ] Acute cor pulmonale  [ ] Asthma/COPD Exacerbation  [ ] Pleural effusion  [ ] Aspiration pneumonia  -------------------------------------------------------------------  [  ]EMERY  [  ]ATN  [  ]Reneal Medullary Necrosis  [  ]Renal Cortical Necrosis  [  ]Other Pathological Lesions:    [  ]CKD 1  [  ]CKD 2  [  ]CKD 3  [  ]CKD 4  [  ]CKD 5  [  ]Other  -------------------------------------------------------------------  [  ]Diabetes  [  ] Diabetic PVD Ulcer  [  ] Neuropathic ulcer to DM  [  ] Diabetes with Nephropathy  [  ] Osteomyelitis due to diabetes  --------------------------------------------------------------------  [  ]Malnutrition: See Nutrition Note  [  ]Cachexia  [  ]Other:   [  ]Supplement Ordered:  [  ]Morbid Obesity (BMI >=40]  ---------------------------------------------------------------------  [ ] Sepsis/severe sepsis/septic shock  [ ] UTI  [ ] Pneumonia  -----------------------------------------------------------------------  [ ] Acidosis/alkalosis  [ ] Fluid overload  [ ] Hypokalemia  [ ] Hyperkalemia  [ ] Hypomagnesemia  [ ] Hypophosphatemia  [ ] Hyperphosphatemia  ------------------------------------------------------------------------  [ ] Acute blood loss anemia  [ ] Post op blood loss anemia  [ ] Iron deficiency anemia  [ ] Anemia due to chronic disease  [ ] Hypercoagulable state  ----------------------------------------------------------------------  [ ] Cerebral infarction  [ ] Transient ischemia attack  [ ] Encephalopathy      Assessment  81yoF with PMH HTN, HLD, CHF, hypothyroidism, rheumatic fever and PSH MVR (Nov. 2000) on coumadin 6d/wk who presents to ED for 3 weeks worsening necrotic, infected L shin wound, now s/p left shin wound debridement 12/24    Vascular/PAD:  -s/p LLE wound debridement (12/24/21 )  -Wound care to L shin daily with xeroform and kerlix  - Pain control prn    MVR:   -Restarted coumadin 12/25; Lovenox to coumadin bridge  -Daily INR    HTN/HLD:  -Continue Carvedilol  -Continue Rosuvastatin (therapeutic interchange)     CAD/CHF:  -Continue Digoxin; AM digoxin level 1.7, holding per cardiology  -Continue Entresto   -Continue Furosemide    Hypothyroidism:  -Continue Synthroid     ID:  -Vancomycin/Zosyn for infected LE wound; downgraded to cefazolin 12/25  -continue to f/u sensitivties      Diet:   -DASH diet    Activity:   -OOBA    DVTPPx:   -Lovenox to coumadin bridge  -No SCDs    Dispo: d/c home, pending confirmation of VNS O/N: one episode of BP 85/41 after entresto, back up to 110's on recheck  12/26: AM digoxin level 1.7-> hold dig today per cards, restarted home PO lasix    Subjective: Patient has no complaints.    ROS:   Denies Headache, blurred vision, Chest Pain, SOB, Abdominal pain, nausea or vomiting     Social   ceFAZolin   IVPB   ceFAZolin   IVPB 1000  carvedilol 3.125  ceFAZolin   IVPB   ceFAZolin   IVPB 1000  enoxaparin Injectable 60  furosemide    Tablet 20  sacubitril 24 mG/valsartan 26 mG 1      Allergies    No Known Allergies    Intolerances        Vital Signs Last 24 Hrs  T(C): 36.3 (27 Dec 2021 04:36), Max: 36.3 (27 Dec 2021 04:36)  T(F): 97.3 (27 Dec 2021 04:36), Max: 97.3 (27 Dec 2021 04:36)  HR: 72 (27 Dec 2021 05:42) (47 - 82)  BP: 109/52 (27 Dec 2021 00:09) (79/44 - 123/55)  BP(mean): --  RR: 18 (27 Dec 2021 00:09) (18 - 18)  SpO2: 97% (27 Dec 2021 00:09) (97% - 98%)  I&O's Summary    25 Dec 2021 07:01  -  26 Dec 2021 07:00  --------------------------------------------------------  IN: 640 mL / OUT: 2650 mL / NET: -2010 mL    26 Dec 2021 07:01  -  27 Dec 2021 06:39  --------------------------------------------------------  IN: 950 mL / OUT: 1150 mL / NET: -200 mL        Physical Exam:  General: No acute distress, resting comfortably in bed  C/V: normal sinus rhythm  Pulm: Nonlabored breathing  Abd: soft, non-tender, non-distended.  Extrem: warm and well perfused, no edema. Palpable pedal pulses bilaterally. 10cm circular necrotic shin wound on LLE; no drainage, pus, malodor noted.      LABS:                        10.1   4.99  )-----------( 202      ( 26 Dec 2021 06:11 )             32.3     12-26    142  |  102  |  20  ----------------------------<  109<H>  3.9   |  32<H>  |  0.85    Ca    9.1      26 Dec 2021 06:11  Phos  2.5     12-26  Mg     2.2     12-26      PT/INR - ( 26 Dec 2021 06:11 )   PT: 15.7 sec;   INR: 1.33          PTT - ( 25 Dec 2021 06:54 )  PTT:87.4 sec    ---------------------------------------------------------------------------  PLEASE CHECK WHEN PRESENT:  [x ] Heart Failure  [  ] Acute  [  ] Acute on Chronic  [x ] Chronic  -------------------------------------------------------------------  [  ]Diastolic [HFpEF]  [  ]Systolic [HFrEF]  [  ]Combined [HFpEF & HFrEF]  [  ] afib  [x ] hypertensive heart disease  [ x ]Other: Rheumatic fever, prosthetic heart valve   -------------------------------------------------------------------  [ ] Respiratory failure  [ ] Acute cor pulmonale  [ ] Asthma/COPD Exacerbation  [ ] Pleural effusion  [ ] Aspiration pneumonia  -------------------------------------------------------------------  [  ]EMERY  [  ]ATN  [  ]Reneal Medullary Necrosis  [  ]Renal Cortical Necrosis  [  ]Other Pathological Lesions:    [  ]CKD 1  [  ]CKD 2  [  ]CKD 3  [  ]CKD 4  [  ]CKD 5  [  ]Other  -------------------------------------------------------------------  [  ]Diabetes  [  ] Diabetic PVD Ulcer  [  ] Neuropathic ulcer to DM  [  ] Diabetes with Nephropathy  [  ] Osteomyelitis due to diabetes  --------------------------------------------------------------------  [  ]Malnutrition: See Nutrition Note  [  ]Cachexia  [  ]Other:   [  ]Supplement Ordered:  [  ]Morbid Obesity (BMI >=40]  ---------------------------------------------------------------------  [ ] Sepsis/severe sepsis/septic shock  [ ] UTI  [ ] Pneumonia  -----------------------------------------------------------------------  [ ] Acidosis/alkalosis  [ ] Fluid overload  [ ] Hypokalemia  [ ] Hyperkalemia  [ ] Hypomagnesemia  [ ] Hypophosphatemia  [ ] Hyperphosphatemia  ------------------------------------------------------------------------  [ ] Acute blood loss anemia  [ ] Post op blood loss anemia  [ ] Iron deficiency anemia  [ ] Anemia due to chronic disease  [ ] Hypercoagulable state  ----------------------------------------------------------------------  [ ] Cerebral infarction  [ ] Transient ischemia attack  [ ] Encephalopathy      Assessment  81yoF with PMH HTN, HLD, CHF, hypothyroidism, rheumatic fever and PSH MVR (Nov. 2000) on coumadin 6d/wk who presents to ED for 3 weeks worsening necrotic, infected L shin wound, now s/p left shin wound debridement 12/24    Vascular/PAD:  -s/p LLE wound debridement (12/24/21 )  -Wound care to L shin daily with xeroform and kerlix  - Pain control prn    MVR:   -Restarted coumadin 12/25; Lovenox to coumadin bridge  -Daily INR    HTN/HLD:  -Continue Carvedilol  -Continue Rosuvastatin (therapeutic interchange)     CAD/CHF:  -Continue Digoxin; AM digoxin level 1.7, holding per cardiology  -Continue Entresto   -Continue Furosemide    Hypothyroidism:  -Continue Synthroid     ID:  -stopped Vancomycin/Zosyn for infected LE wound; downgraded to cefazolin 12/25  -continue to f/u sensitivities      Diet:   -DASH diet    Activity:   -OOBA    DVTPPx:   -Lovenox to coumadin bridge  -No SCDs    Dispo: d/c home, pending confirmation of VNS

## 2021-12-28 LAB
CULTURE RESULTS: SIGNIFICANT CHANGE UP
CULTURE RESULTS: SIGNIFICANT CHANGE UP
SPECIMEN SOURCE: SIGNIFICANT CHANGE UP
SPECIMEN SOURCE: SIGNIFICANT CHANGE UP

## 2021-12-30 ENCOUNTER — APPOINTMENT (OUTPATIENT)
Dept: VASCULAR SURGERY | Facility: CLINIC | Age: 81
End: 2021-12-30
Payer: MEDICARE

## 2021-12-30 PROBLEM — I50.9 HEART FAILURE, UNSPECIFIED: Chronic | Status: ACTIVE | Noted: 2021-12-23

## 2021-12-30 PROBLEM — I48.20 CHRONIC ATRIAL FIBRILLATION, UNSPECIFIED: Chronic | Status: ACTIVE | Noted: 2021-12-25

## 2021-12-30 PROBLEM — I00 RHEUMATIC FEVER WITHOUT HEART INVOLVEMENT: Chronic | Status: ACTIVE | Noted: 2021-12-23

## 2021-12-30 PROBLEM — I51.7 CARDIOMEGALY: Chronic | Status: ACTIVE | Noted: 2021-12-25

## 2021-12-30 PROBLEM — E78.5 HYPERLIPIDEMIA, UNSPECIFIED: Chronic | Status: ACTIVE | Noted: 2021-12-23

## 2021-12-30 PROBLEM — E03.9 HYPOTHYROIDISM, UNSPECIFIED: Chronic | Status: ACTIVE | Noted: 2021-12-23

## 2021-12-30 PROBLEM — B95.7 OTHER STAPHYLOCOCCUS AS THE CAUSE OF DISEASES CLASSIFIED ELSEWHERE: Chronic | Status: ACTIVE | Noted: 2021-12-25

## 2021-12-30 PROBLEM — J90 PLEURAL EFFUSION, NOT ELSEWHERE CLASSIFIED: Chronic | Status: ACTIVE | Noted: 2021-12-25

## 2021-12-30 PROBLEM — Z95.2 PRESENCE OF PROSTHETIC HEART VALVE: Chronic | Status: ACTIVE | Noted: 2021-12-25

## 2021-12-30 PROBLEM — D62 ACUTE POSTHEMORRHAGIC ANEMIA: Chronic | Status: ACTIVE | Noted: 2021-12-25

## 2021-12-30 PROBLEM — I10 ESSENTIAL (PRIMARY) HYPERTENSION: Chronic | Status: ACTIVE | Noted: 2021-12-23

## 2021-12-30 PROCEDURE — 99215 OFFICE O/P EST HI 40 MIN: CPT | Mod: 25

## 2021-12-30 PROCEDURE — 29580 STRAPPING UNNA BOOT: CPT | Mod: LT

## 2022-01-03 DIAGNOSIS — I51.7 CARDIOMEGALY: ICD-10-CM

## 2022-01-03 DIAGNOSIS — I50.22 CHRONIC SYSTOLIC (CONGESTIVE) HEART FAILURE: ICD-10-CM

## 2022-01-03 DIAGNOSIS — S81.802A UNSPECIFIED OPEN WOUND, LEFT LOWER LEG, INITIAL ENCOUNTER: ICD-10-CM

## 2022-01-03 DIAGNOSIS — I25.10 ATHEROSCLEROTIC HEART DISEASE OF NATIVE CORONARY ARTERY WITHOUT ANGINA PECTORIS: ICD-10-CM

## 2022-01-03 DIAGNOSIS — I70.248 ATHEROSCLEROSIS OF NATIVE ARTERIES OF LEFT LEG WITH ULCERATION OF OTHER PART OF LOWER LEG: ICD-10-CM

## 2022-01-03 DIAGNOSIS — I35.0 NONRHEUMATIC AORTIC (VALVE) STENOSIS: ICD-10-CM

## 2022-01-03 DIAGNOSIS — E03.9 HYPOTHYROIDISM, UNSPECIFIED: ICD-10-CM

## 2022-01-03 DIAGNOSIS — Z95.2 PRESENCE OF PROSTHETIC HEART VALVE: ICD-10-CM

## 2022-01-03 DIAGNOSIS — L97.829 NON-PRESSURE CHRONIC ULCER OF OTHER PART OF LEFT LOWER LEG WITH UNSPECIFIED SEVERITY: ICD-10-CM

## 2022-01-03 DIAGNOSIS — D62 ACUTE POSTHEMORRHAGIC ANEMIA: ICD-10-CM

## 2022-01-03 DIAGNOSIS — E78.5 HYPERLIPIDEMIA, UNSPECIFIED: ICD-10-CM

## 2022-01-03 DIAGNOSIS — R79.1 ABNORMAL COAGULATION PROFILE: ICD-10-CM

## 2022-01-03 DIAGNOSIS — I09.89: ICD-10-CM

## 2022-01-03 DIAGNOSIS — B95.8 UNSPECIFIED STAPHYLOCOCCUS AS THE CAUSE OF DISEASES CLASSIFIED ELSEWHERE: ICD-10-CM

## 2022-01-03 DIAGNOSIS — I48.0 PAROXYSMAL ATRIAL FIBRILLATION: ICD-10-CM

## 2022-01-03 DIAGNOSIS — L03.116 CELLULITIS OF LEFT LOWER LIMB: ICD-10-CM

## 2022-01-03 DIAGNOSIS — I48.92 UNSPECIFIED ATRIAL FLUTTER: ICD-10-CM

## 2022-01-03 DIAGNOSIS — I11.0 HYPERTENSIVE HEART DISEASE WITH HEART FAILURE: ICD-10-CM

## 2022-01-03 DIAGNOSIS — Z79.01 LONG TERM (CURRENT) USE OF ANTICOAGULANTS: ICD-10-CM

## 2022-01-03 DIAGNOSIS — I48.20 CHRONIC ATRIAL FIBRILLATION, UNSPECIFIED: ICD-10-CM

## 2022-01-06 ENCOUNTER — APPOINTMENT (OUTPATIENT)
Dept: VASCULAR SURGERY | Facility: CLINIC | Age: 82
End: 2022-01-06
Payer: MEDICARE

## 2022-01-06 PROCEDURE — 97597 DBRDMT OPN WND 1ST 20 CM/<: CPT

## 2022-01-06 PROCEDURE — 29580 STRAPPING UNNA BOOT: CPT | Mod: LT,59

## 2022-01-06 PROCEDURE — 99215 OFFICE O/P EST HI 40 MIN: CPT | Mod: 25

## 2022-01-13 ENCOUNTER — APPOINTMENT (OUTPATIENT)
Dept: VASCULAR SURGERY | Facility: CLINIC | Age: 82
End: 2022-01-13
Payer: MEDICARE

## 2022-01-13 PROCEDURE — 99214 OFFICE O/P EST MOD 30 MIN: CPT | Mod: 25

## 2022-01-13 PROCEDURE — 29580 STRAPPING UNNA BOOT: CPT | Mod: LT

## 2022-01-20 ENCOUNTER — APPOINTMENT (OUTPATIENT)
Dept: VASCULAR SURGERY | Facility: CLINIC | Age: 82
End: 2022-01-20

## 2022-01-27 ENCOUNTER — APPOINTMENT (OUTPATIENT)
Dept: VASCULAR SURGERY | Facility: CLINIC | Age: 82
End: 2022-01-27
Payer: MEDICARE

## 2022-01-27 PROCEDURE — 97597 DBRDMT OPN WND 1ST 20 CM/<: CPT

## 2022-01-27 PROCEDURE — 29580 STRAPPING UNNA BOOT: CPT | Mod: LT,59

## 2022-01-27 PROCEDURE — 99214 OFFICE O/P EST MOD 30 MIN: CPT | Mod: 25

## 2022-02-10 ENCOUNTER — APPOINTMENT (OUTPATIENT)
Dept: VASCULAR SURGERY | Facility: CLINIC | Age: 82
End: 2022-02-10
Payer: MEDICARE

## 2022-02-10 PROCEDURE — 99214 OFFICE O/P EST MOD 30 MIN: CPT | Mod: 25

## 2022-02-10 PROCEDURE — 29580 STRAPPING UNNA BOOT: CPT | Mod: LT

## 2022-02-17 ENCOUNTER — APPOINTMENT (OUTPATIENT)
Dept: VASCULAR SURGERY | Facility: CLINIC | Age: 82
End: 2022-02-17
Payer: MEDICARE

## 2022-02-17 PROCEDURE — 99212 OFFICE O/P EST SF 10 MIN: CPT

## 2022-02-18 ENCOUNTER — APPOINTMENT (OUTPATIENT)
Dept: VASCULAR SURGERY | Facility: CLINIC | Age: 82
End: 2022-02-18
Payer: MEDICARE

## 2022-02-18 PROCEDURE — 99215 OFFICE O/P EST HI 40 MIN: CPT | Mod: 25

## 2022-02-18 PROCEDURE — 10140 I&D HMTMA SEROMA/FLUID COLLJ: CPT

## 2022-02-18 PROCEDURE — 29580 STRAPPING UNNA BOOT: CPT | Mod: LT,59

## 2022-02-18 PROCEDURE — 97597 DBRDMT OPN WND 1ST 20 CM/<: CPT

## 2022-02-22 ENCOUNTER — APPOINTMENT (OUTPATIENT)
Dept: VASCULAR SURGERY | Facility: CLINIC | Age: 82
End: 2022-02-22
Payer: MEDICARE

## 2022-02-22 PROCEDURE — 99215 OFFICE O/P EST HI 40 MIN: CPT | Mod: 25

## 2022-02-22 PROCEDURE — 29580 STRAPPING UNNA BOOT: CPT | Mod: LT,58

## 2022-02-24 ENCOUNTER — APPOINTMENT (OUTPATIENT)
Dept: VASCULAR SURGERY | Facility: CLINIC | Age: 82
End: 2022-02-24
Payer: MEDICARE

## 2022-02-24 PROCEDURE — 99214 OFFICE O/P EST MOD 30 MIN: CPT | Mod: 25

## 2022-02-24 PROCEDURE — 29580 STRAPPING UNNA BOOT: CPT | Mod: LT,58

## 2022-03-03 ENCOUNTER — APPOINTMENT (OUTPATIENT)
Dept: VASCULAR SURGERY | Facility: CLINIC | Age: 82
End: 2022-03-03
Payer: MEDICARE

## 2022-03-03 PROCEDURE — 99214 OFFICE O/P EST MOD 30 MIN: CPT | Mod: 25

## 2022-03-03 PROCEDURE — 29580 STRAPPING UNNA BOOT: CPT | Mod: LT

## 2022-03-10 ENCOUNTER — APPOINTMENT (OUTPATIENT)
Dept: VASCULAR SURGERY | Facility: CLINIC | Age: 82
End: 2022-03-10
Payer: MEDICARE

## 2022-03-10 PROCEDURE — 99214 OFFICE O/P EST MOD 30 MIN: CPT | Mod: 25

## 2022-03-10 PROCEDURE — 29580 STRAPPING UNNA BOOT: CPT | Mod: LT

## 2022-03-17 ENCOUNTER — APPOINTMENT (OUTPATIENT)
Dept: VASCULAR SURGERY | Facility: CLINIC | Age: 82
End: 2022-03-17
Payer: MEDICARE

## 2022-03-17 PROCEDURE — 29580 STRAPPING UNNA BOOT: CPT | Mod: LT,59

## 2022-03-17 PROCEDURE — 97597 DBRDMT OPN WND 1ST 20 CM/<: CPT

## 2022-03-17 PROCEDURE — 99214 OFFICE O/P EST MOD 30 MIN: CPT | Mod: 25

## 2022-03-24 ENCOUNTER — APPOINTMENT (OUTPATIENT)
Dept: VASCULAR SURGERY | Facility: CLINIC | Age: 82
End: 2022-03-24
Payer: MEDICARE

## 2022-03-24 PROCEDURE — 29580 STRAPPING UNNA BOOT: CPT | Mod: LT

## 2022-03-24 PROCEDURE — 97602 WOUND(S) CARE NON-SELECTIVE: CPT

## 2022-03-24 PROCEDURE — 99214 OFFICE O/P EST MOD 30 MIN: CPT | Mod: 25

## 2022-03-31 ENCOUNTER — APPOINTMENT (OUTPATIENT)
Dept: VASCULAR SURGERY | Facility: CLINIC | Age: 82
End: 2022-03-31
Payer: MEDICARE

## 2022-03-31 PROCEDURE — 99213 OFFICE O/P EST LOW 20 MIN: CPT | Mod: 25

## 2022-03-31 PROCEDURE — 29580 STRAPPING UNNA BOOT: CPT | Mod: LT

## 2022-04-07 ENCOUNTER — APPOINTMENT (OUTPATIENT)
Dept: VASCULAR SURGERY | Facility: CLINIC | Age: 82
End: 2022-04-07
Payer: MEDICARE

## 2022-04-07 DIAGNOSIS — Z95.2 PRESENCE OF PROSTHETIC HEART VALVE: ICD-10-CM

## 2022-04-07 PROCEDURE — 99214 OFFICE O/P EST MOD 30 MIN: CPT | Mod: 25

## 2022-04-07 PROCEDURE — 29580 STRAPPING UNNA BOOT: CPT | Mod: 59,LT

## 2022-04-07 PROCEDURE — 10140 I&D HMTMA SEROMA/FLUID COLLJ: CPT

## 2022-04-09 ENCOUNTER — APPOINTMENT (OUTPATIENT)
Dept: VASCULAR SURGERY | Facility: CLINIC | Age: 82
End: 2022-04-09
Payer: MEDICARE

## 2022-04-09 DIAGNOSIS — T14.8XXA OTHER INJURY OF UNSPECIFIED BODY REGION, INITIAL ENCOUNTER: ICD-10-CM

## 2022-04-09 DIAGNOSIS — I83.899 VARICOSE VEINS OF UNSPECIFIED LOWER EXTREMITY WITH OTHER COMPLICATIONS: ICD-10-CM

## 2022-04-09 LAB
INR PPP: 3.32 RATIO
PT BLD: 39.3 SEC

## 2022-04-09 PROCEDURE — 99024 POSTOP FOLLOW-UP VISIT: CPT

## 2022-04-09 PROCEDURE — 97597 DBRDMT OPN WND 1ST 20 CM/<: CPT

## 2022-04-09 PROCEDURE — 29581 APPL MULTLAYER CMPRN SYS LEG: CPT | Mod: LT,58

## 2022-04-09 PROCEDURE — 37799 UNLISTED PX VASCULAR SURGERY: CPT | Mod: 58

## 2022-04-09 RX ORDER — CEFADROXIL 500 MG/1
500 CAPSULE ORAL TWICE DAILY
Qty: 6 | Refills: 0 | Status: ACTIVE | COMMUNITY
Start: 2022-04-09 | End: 1900-01-01

## 2022-04-14 ENCOUNTER — APPOINTMENT (OUTPATIENT)
Dept: VASCULAR SURGERY | Facility: CLINIC | Age: 82
End: 2022-04-14
Payer: MEDICARE

## 2022-04-14 PROBLEM — T14.8XXA HEMORRHAGE FROM WOUND: Status: ACTIVE | Noted: 2022-04-14

## 2022-04-14 PROBLEM — I83.899 RUPTURED VARICOSITY: Status: ACTIVE | Noted: 2022-04-09

## 2022-04-14 PROCEDURE — 99024 POSTOP FOLLOW-UP VISIT: CPT

## 2022-04-14 PROCEDURE — 29581 APPL MULTLAYER CMPRN SYS LEG: CPT | Mod: 58

## 2022-04-16 ENCOUNTER — TRANSCRIPTION ENCOUNTER (OUTPATIENT)
Age: 82
End: 2022-04-16

## 2022-04-21 ENCOUNTER — APPOINTMENT (OUTPATIENT)
Dept: VASCULAR SURGERY | Facility: CLINIC | Age: 82
End: 2022-04-21
Payer: MEDICARE

## 2022-04-21 DIAGNOSIS — I48.91 UNSPECIFIED ATRIAL FIBRILLATION: ICD-10-CM

## 2022-04-21 PROCEDURE — 99214 OFFICE O/P EST MOD 30 MIN: CPT | Mod: 25

## 2022-04-21 PROCEDURE — 29581 APPL MULTLAYER CMPRN SYS LEG: CPT | Mod: LT

## 2022-05-05 ENCOUNTER — APPOINTMENT (OUTPATIENT)
Dept: VASCULAR SURGERY | Facility: CLINIC | Age: 82
End: 2022-05-05
Payer: MEDICARE

## 2022-05-05 DIAGNOSIS — Z79.01 LONG TERM (CURRENT) USE OF ANTICOAGULANTS: ICD-10-CM

## 2022-05-05 PROCEDURE — 99212 OFFICE O/P EST SF 10 MIN: CPT | Mod: 25

## 2022-05-05 PROCEDURE — 29581 APPL MULTLAYER CMPRN SYS LEG: CPT

## 2022-05-19 ENCOUNTER — APPOINTMENT (OUTPATIENT)
Dept: VASCULAR SURGERY | Facility: CLINIC | Age: 82
End: 2022-05-19
Payer: MEDICARE

## 2022-05-19 DIAGNOSIS — S81.812A LACERATION W/OUT FOREIGN BODY, LEFT LOWER LEG, INITIAL ENCOUNTER: ICD-10-CM

## 2022-05-19 PROCEDURE — 99212 OFFICE O/P EST SF 10 MIN: CPT

## 2022-06-02 ENCOUNTER — APPOINTMENT (OUTPATIENT)
Dept: VASCULAR SURGERY | Facility: CLINIC | Age: 82
End: 2022-06-02
Payer: MEDICARE

## 2022-06-02 DIAGNOSIS — S80.10XA CONTUSION OF UNSPECIFIED LOWER LEG, INITIAL ENCOUNTER: ICD-10-CM

## 2022-06-02 DIAGNOSIS — I50.9 HEART FAILURE, UNSPECIFIED: ICD-10-CM

## 2022-06-02 PROCEDURE — 99212 OFFICE O/P EST SF 10 MIN: CPT

## 2022-07-06 ENCOUNTER — NON-APPOINTMENT (OUTPATIENT)
Age: 82
End: 2022-07-06

## 2022-07-08 ENCOUNTER — NON-APPOINTMENT (OUTPATIENT)
Age: 82
End: 2022-07-08

## 2022-07-11 ENCOUNTER — APPOINTMENT (OUTPATIENT)
Dept: ORTHOPEDIC SURGERY | Facility: CLINIC | Age: 82
End: 2022-07-11

## 2022-07-11 VITALS — WEIGHT: 124 LBS | BODY MASS INDEX: 19.93 KG/M2 | HEIGHT: 66 IN | RESPIRATION RATE: 16 BRPM

## 2022-07-11 DIAGNOSIS — S49.91XA UNSPECIFIED INJURY OF RIGHT SHOULDER AND UPPER ARM, INITIAL ENCOUNTER: ICD-10-CM

## 2022-07-11 PROCEDURE — 73090 X-RAY EXAM OF FOREARM: CPT | Mod: RT

## 2022-07-11 PROCEDURE — 99204 OFFICE O/P NEW MOD 45 MIN: CPT

## 2022-07-11 PROCEDURE — 76882 US LMTD JT/FCL EVL NVASC XTR: CPT

## 2022-07-11 PROCEDURE — 73130 X-RAY EXAM OF HAND: CPT | Mod: LT

## 2022-07-11 RX ORDER — CEPHALEXIN 500 MG/1
500 CAPSULE ORAL 4 TIMES DAILY
Qty: 20 | Refills: 0 | Status: ACTIVE | COMMUNITY
Start: 2022-07-11 | End: 1900-01-01

## 2022-07-11 NOTE — ASSESSMENT
[FreeTextEntry1] : My impression is that the patient has a left thumb first webspace mass that is solid.  Discussed various etiologies including giant cell tumor tendon sheath.  We will order an MRI with gadolinium.  I will call her when I receive the results.  She will continue with dressing changes right dorsal forearm wound.

## 2022-07-11 NOTE — HISTORY OF PRESENT ILLNESS
[FreeTextEntry1] : Patient presents for evaluation of her left first webspace soft tissue mass without pain, no trauma which she noticed about 2 months ago. Patient notes that the mass has been slowly enlarging. Patient is on Coumadin for stent.  Patent has seen Dr. Helm and was offered either surgery or observation. \par \par Patient is also here for evaluation of her right posterior forearm skin avulsion when she hit her forearm on a  on 7/6/2022. She went to urgent care and was given steri-strips.

## 2022-07-11 NOTE — PHYSICAL EXAM
[de-identified] : On physical exam there is a firm soft tissue mass in the first webspace slightly dorsally.  Appears here to the thumb MCP joint.  It is not compressible.  It is subcutaneous.\par \par Evaluation of dorsal forearm demonstrates a V shaped laceration which was likely a skin flap.  Small amount of eschar at the apex but otherwise viable.  No infection [de-identified] : PA lateral oblique x-rays of both hands do not show any significant acute osseous abnormality.  PA and lateral of the right forearm normal\par \par Ultrasound demonstrates a solid soft tissue mass

## 2022-07-11 NOTE — REVIEW OF SYSTEMS
[Negative] : Allergic/Immunologic [Right] : right [Arthralgia] : arthralgia [Joint Pain] : joint pain

## 2022-07-18 ENCOUNTER — APPOINTMENT (OUTPATIENT)
Dept: ORTHOPEDIC SURGERY | Facility: CLINIC | Age: 82
End: 2022-07-18

## 2022-07-18 PROCEDURE — 99442: CPT | Mod: 95

## 2022-07-18 RX ORDER — CARVEDILOL 3.12 MG/1
TABLET, FILM COATED ORAL
Refills: 0 | Status: ACTIVE | COMMUNITY

## 2022-07-18 RX ORDER — ROSUVASTATIN CALCIUM 5 MG/1
TABLET, FILM COATED ORAL
Refills: 0 | Status: ACTIVE | COMMUNITY

## 2022-07-18 RX ORDER — LEVOTHYROXINE SODIUM 137 UG/1
TABLET ORAL
Refills: 0 | Status: ACTIVE | COMMUNITY

## 2022-07-18 RX ORDER — TRAMADOL HYDROCHLORIDE 25 MG/1
TABLET, COATED ORAL
Refills: 0 | Status: ACTIVE | COMMUNITY

## 2022-07-18 RX ORDER — DIGOXIN 0.06 MG/1
TABLET ORAL
Refills: 0 | Status: ACTIVE | COMMUNITY

## 2022-07-18 RX ORDER — SACUBITRIL AND VALSARTAN 49; 51 MG/1; MG/1
TABLET, FILM COATED ORAL
Refills: 0 | Status: ACTIVE | COMMUNITY

## 2022-07-18 RX ORDER — WARFARIN SODIUM 5 MG/1
5 TABLET ORAL
Refills: 0 | Status: ACTIVE | COMMUNITY

## 2022-08-03 ENCOUNTER — APPOINTMENT (OUTPATIENT)
Dept: ORTHOPEDIC SURGERY | Facility: CLINIC | Age: 82
End: 2022-08-03

## 2022-08-03 VITALS — RESPIRATION RATE: 16 BRPM | WEIGHT: 124 LBS | HEIGHT: 66 IN | BODY MASS INDEX: 19.93 KG/M2

## 2022-08-03 DIAGNOSIS — M79.89 OTHER SPECIFIED SOFT TISSUE DISORDERS: ICD-10-CM

## 2022-08-03 PROCEDURE — 99214 OFFICE O/P EST MOD 30 MIN: CPT | Mod: 25

## 2022-08-03 PROCEDURE — 20612 ASPIRATE/INJ GANGLION CYST: CPT

## 2022-08-03 RX ORDER — EPLERENONE 25 MG/1
25 TABLET, COATED ORAL
Refills: 0 | Status: ACTIVE | COMMUNITY

## 2022-08-03 RX ORDER — POTASSIUM CHLORIDE 600 MG/1
TABLET, FILM COATED, EXTENDED RELEASE ORAL
Refills: 0 | Status: DISCONTINUED | COMMUNITY
End: 2022-08-03

## 2022-09-23 ENCOUNTER — NON-APPOINTMENT (OUTPATIENT)
Age: 82
End: 2022-09-23

## 2022-10-07 ENCOUNTER — NON-APPOINTMENT (OUTPATIENT)
Age: 82
End: 2022-10-07

## 2022-11-10 ENCOUNTER — APPOINTMENT (OUTPATIENT)
Dept: VASCULAR SURGERY | Facility: CLINIC | Age: 82
End: 2022-11-10

## 2022-11-10 DIAGNOSIS — I89.0 LYMPHEDEMA, NOT ELSEWHERE CLASSIFIED: ICD-10-CM

## 2022-11-10 DIAGNOSIS — I96 GANGRENE, NOT ELSEWHERE CLASSIFIED: ICD-10-CM

## 2022-11-10 PROCEDURE — 99212 OFFICE O/P EST SF 10 MIN: CPT

## 2023-02-15 ENCOUNTER — NON-APPOINTMENT (OUTPATIENT)
Age: 83
End: 2023-02-15

## 2023-03-07 NOTE — DISCHARGE NOTE PROVIDER - NSDCHHATTENDCERT_GEN_ALL_CORE
My signature below certifies that the above stated patient is homebound and upon completion of the Face-To-Face encounter, has the need for intermittent skilled nursing, physical therapy and/or speech or occupational therapy services in their home for their current diagnosis as outlined in their initial plan of care. These services will continue to be monitored by myself or another physician. Tetracycline Counseling: Patient counseled regarding possible photosensitivity and increased risk for sunburn.  Patient instructed to avoid sunlight, if possible.  When exposed to sunlight, patients should wear protective clothing, sunglasses, and sunscreen.  The patient was instructed to call the office immediately if the following severe adverse effects occur:  hearing changes, easy bruising/bleeding, severe headache, or vision changes.  The patient verbalized understanding of the proper use and possible adverse effects of tetracycline.  All of the patient's questions and concerns were addressed. Patient understands to avoid pregnancy while on therapy due to potential birth defects.

## 2023-03-15 ENCOUNTER — NON-APPOINTMENT (OUTPATIENT)
Age: 83
End: 2023-03-15

## 2023-05-06 NOTE — ED ADULT NURSE NOTE - NS ED NURSE LEVEL OF CONSCIOUSNESS SPEECH
I concur with the Admission Order and I certify that services are provided in accordance with Section 42 CFR § 412.3
Speaking Coherently

## 2025-05-09 NOTE — PHYSICAL THERAPY INITIAL EVALUATION ADULT - STANDING BALANCE: STATIC
Problem: Activity Restriction  Goal: Patient will understand activity restrictions  Intervention: Activity assessment per Cardiac Rehab  Note: Intervention Status  Done     Problem: Activity/Exercise Intolerance  Goal: Patient will tolerate activity/exercise  Intervention: Progress activity per Cardiac Rehab protocol  Note: Intervention Status  Done  Intervention: Progressive graded ambulation  Note: Intervention Status  Done  Intervention: Collaborate with nursing to ensure ambulation 4-6 times per day  Note: Intervention Status  Done  Intervention: Monitor and document progressive activity response  Note: Intervention Status  Done  Intervention: Encourage hourly incentive spirometry, cough and deep breathe  Note: Intervention Status  Done      good balance